# Patient Record
Sex: FEMALE
[De-identification: names, ages, dates, MRNs, and addresses within clinical notes are randomized per-mention and may not be internally consistent; named-entity substitution may affect disease eponyms.]

---

## 2018-07-10 ENCOUNTER — HOSPITAL ENCOUNTER (EMERGENCY)
Dept: HOSPITAL 97 - ER | Age: 23
LOS: 1 days | Discharge: HOME | End: 2018-07-11
Payer: COMMERCIAL

## 2018-07-10 DIAGNOSIS — Z3A.08: ICD-10-CM

## 2018-07-10 DIAGNOSIS — R10.30: ICD-10-CM

## 2018-07-10 DIAGNOSIS — O26.891: Primary | ICD-10-CM

## 2018-07-10 DIAGNOSIS — Z88.1: ICD-10-CM

## 2018-07-10 LAB
BUN BLD-MCNC: 10 MG/DL (ref 7–18)
GLUCOSE SERPLBLD-MCNC: 82 MG/DL (ref 74–106)
HCG SERPL-ACNC: (no result) MIU/ML (ref 1–3)
HCT VFR BLD CALC: 35.4 % (ref 36–45)
LYMPHOCYTES # SPEC AUTO: 1.6 K/UL (ref 0.7–4.9)
MCH RBC QN AUTO: 25.6 PG (ref 27–35)
MCV RBC: 75.7 FL (ref 80–100)
PMV BLD: 8.7 FL (ref 7.6–11.3)
POTASSIUM SERPL-SCNC: 3.3 MMOL/L (ref 3.5–5.1)
RBC # BLD: 4.68 M/UL (ref 3.86–4.86)

## 2018-07-10 PROCEDURE — 76817 TRANSVAGINAL US OBSTETRIC: CPT

## 2018-07-10 PROCEDURE — 81003 URINALYSIS AUTO W/O SCOPE: CPT

## 2018-07-10 PROCEDURE — 36415 COLL VENOUS BLD VENIPUNCTURE: CPT

## 2018-07-10 PROCEDURE — 96375 TX/PRO/DX INJ NEW DRUG ADDON: CPT

## 2018-07-10 PROCEDURE — 81025 URINE PREGNANCY TEST: CPT

## 2018-07-10 PROCEDURE — 96374 THER/PROPH/DIAG INJ IV PUSH: CPT

## 2018-07-10 PROCEDURE — 85025 COMPLETE CBC W/AUTO DIFF WBC: CPT

## 2018-07-10 PROCEDURE — 99284 EMERGENCY DEPT VISIT MOD MDM: CPT

## 2018-07-10 PROCEDURE — 84702 CHORIONIC GONADOTROPIN TEST: CPT

## 2018-07-10 PROCEDURE — 80048 BASIC METABOLIC PNL TOTAL CA: CPT

## 2018-07-10 NOTE — ER
Nurse's Notes                                                                                     

 [facility]                                                                

Name: Thomas Chung                                                                               

Age: 23 yrs                                                                                       

Sex: Female                                                                                       

: 1995                                                                                   

MRN: [id number]                                                                                   

Arrival Date: 07/10/2018                                                                          

Time: 21:38                                                                                       

Account#: [id number]                                                                            

Bed 6                                                                                             

Private MD:                                                                                       

Diagnosis: Lower abdominal pain, unspecified                                                      

                                                                                                  

Presentation:                                                                                     

07/10                                                                                             

21:57 Presenting complaint: Patient states: lower abd pain started today. pt sees Dr. Gold  ak1 

      for OB services. Transition of care: patient was not received from another setting of       

      care. Onset of symptoms was July 10, 2018. Risk Assessment: Do you want to hurt             

      yourself or someone else? Patient reports no desire to harm self or others. Initial         

      Sepsis Screen: Does the patient meet any 2 criteria? No. Patient's initial sepsis           

      screen is negative. Does the patient have a suspected source of infection? No.              

      Patient's initial sepsis screen is negative. Care prior to arrival: None.                   

21:57 Method Of Arrival: Ambulatory                                                           ak1 

21:57 Acuity: IVETTE 3                                                                           ak1 

                                                                                                  

Triage Assessment:                                                                                

22:01 General: Appears in no apparent distress. Behavior is calm, cooperative. Pain:          ak1 

      Complains of pain in suprapubic area, right lower quadrant and left lower quadrant.         

      EENT: No signs and/or symptoms were reported regarding the EENT system. Neuro: No           

      deficits noted. Cardiovascular: No deficits noted. Respiratory: No deficits noted. GI:      

      Reports lower abdominal pain. : Reports cramping, in lower abd Denies discharge,          

      vaginal bleeding. Derm: No signs and/or symptoms reported regarding the dermatologic        

      system. Musculoskeletal: No signs and/or symptoms reported regarding the                    

      musculoskeletal system.                                                                     

                                                                                                  

OB/GYN:                                                                                           

21:56 LMP 2018, Pregnancy Verified, EDC 2019, Gestational age from LMP: 8 weeks 4   ak1 

      days                                                                                        

23:27  2, Full Term 1, Premature 0,  0, Living 1                               [id number] 

                                                                                                  

Historical:                                                                                       

- Allergies:                                                                                      

22:01 Amoxicillin;                                                                            ak1 

- Home Meds:                                                                                      

22:01 None [Active];                                                                          ak1 

- PMHx:                                                                                           

22:01 scoliosis;                                                                              ak1 

- PSHx:                                                                                           

22:01 Appendectomy; ;                                                                ak1 

                                                                                                  

- Immunization history:: Adult Immunizations unknown.                                             

- Social history:: Smoking status: Patient/guardian denies using tobacco.                         

- Ebola Screening: : No symptoms or risks identified at this time.                                

                                                                                                  

                                                                                                  

Screenin:03 Abuse screen: Denies threats or abuse. Denies injuries from another. Nutritional        ak1 

      screening: No deficits noted. Tuberculosis screening: No symptoms or risk factors           

      identified. Fall Risk None identified.                                                      

                                                                                                  

Assessment:                                                                                       

22:03 Reassessment: Patient appears in no apparent distress at this time. No changes from     ak1 

      previously documented assessment. see triage assessment.                                    

22:19 General: Appears uncomfortable, Behavior is calm, cooperative, appropriate for age.     ea  

      Pain: Complains of pain in right lower quadrant and righ upper quadrant Pain currently      

      is 9 out of 10 on a pain scale. Quality of pain is described as aching, Is                  

      intermittent. Neuro: Level of Consciousness is awake, alert, obeys commands, Oriented       

      to person, place, time, situation. Cardiovascular: Patient's skin is warm and dry.          

      Respiratory: Airway is patent Respiratory effort is even, unlabored, Respiratory            

      pattern is regular, symmetrical, Breath sounds are clear bilaterally. GI: Abdomen is        

      non-distended, Bowel sounds present X 4 quads. GI: Reports lower abdominal pain, upper      

      abdominal pain, diarrhea, vomiting. : No signs and/or symptoms were reported              

      regarding the genitourinary system. Derm: Skin is pink, warm \T\ dry.                       

22:35 Reassessment: Pt taken to ultrasound.                                                   ea  

23:00 Reassessment: Patient and/or family updated on plan of care and expected duration. Pain ea  

      level reassessed. Patient is alert, oriented x 3, equal unlabored respirations, skin        

      warm/dry/pink.                                                                              

                                                                                             

00:05 Reassessment: Patient and/or family updated on plan of care and expected duration. Pain ea  

      level reassessed. Patient is alert, oriented x 3, equal unlabored respirations, skin        

      warm/dry/pink. Discharge instructions given to patient, verbalized the understanding of     

      instruction.                                                                                

                                                                                                  

Vital Signs:                                                                                      

07/10                                                                                             

21:56  / 71; Pulse 79; Resp 18; Temp 98.1; Pulse Ox 99% on R/A; Weight 53.98 kg (R);    ak1 

      Height 4 ft. 11 in. (149.86 cm) (R); Pain 7/10;                                             

22:21  / 72; Pulse 90; Resp 18; Pulse Ox 98% on R/A; Pain 7/10;                         ea  

23:45  / 76; Pulse 80; Resp 18; Temp 97.8; Pulse Ox 99% on R/A; Pain 0/10;              ea  

21:56 Body Mass Index 24.03 (53.98 kg, 149.86 cm)                                             ak1 

                                                                                                  

ED Course:                                                                                        

21:38 Patient arrived in ED.                                                                  al2 

21:45 Nevaeh Braun, RN is Primary Nurse.                                                    ea  

21:56 Arm band placed on Patient placed in an exam room, on a stretcher, Patient notified of  ak1 

      wait time.                                                                                  

22:00 Triage completed.                                                                       ak1 

22:03 Patient has correct armband on for positive identification. Bed in low position. Call   ak1 

      light in reach. Side rails up X 1. Adult w/ patient. Pulse ox on. NIBP on.                  

22:03 Urine collected: clean catch specimen.                                                  ak1 

22:09 Lincoln Hernandez PA is PHCP.                                                               [id number] 

22:09 Jac Morales MD is Attending Physician.                                             [id number] 

22:32 Inserted saline lock: 20 gauge in right antecubital area, using aseptic technique.      ea  

      Blood collected.                                                                            

22:54 US Transvaginal Ob In Process Unspecified.                                              EDMS

                                                                                             

00:06 No provider procedures requiring assistance completed. IV discontinued, intact,         ea  

      bleeding controlled, No redness/swelling at site. Pressure dressing applied.                

                                                                                                  

Administered Medications:                                                                         

07/10                                                                                             

23:26 Drug: Phenergan 12.5 mg Route: IVP; Site: right antecubital;                            ea  

23:48 Follow up: Response: Nausea unchanged                                                   lp1 

23:36 Drug: Zofran 4 mg Route: IVP; Site: right antecubital;                                  ea  

                                                                                             

00:05 Follow up: Response: No adverse reaction                                                ea  

07/10                                                                                             

23:48 Drug: Potassium Chloride 40 mEq Route: PO;                                              lp1 

                                                                                             

00:05 Follow up: Response: No adverse reaction                                                ea  

07/10                                                                                             

23:48 Drug: Pepcid 20 mg Route: IVP; Site: right antecubital;                                 ea  

                                                                                             

00:05 Follow up: Response: No adverse reaction                                                ea  

                                                                                                  

                                                                                                  

Outcome:                                                                                          

07/10                                                                                             

23:31 Discharge ordered by MD.                                                                [id number] 

                                                                                             

00:06 Discharged to home ambulatory, with family.                                             ea  

      Condition: improved                                                                         

      Discharge instructions given to patient, Instructed on discharge instructions, follow       

      up and referral plans. Demonstrated understanding of instructions, follow-up care.          

00:09 Patient left the ED.                                                                    ea  

                                                                                                  

Signatures:                                                                                       

Dispatcher [employer]                           EDMS                                                 

Jane Decker RN                         RN   lp1                                                  

Lincoln Hernandez PA PA   jr8                                                  

Sanjana Mustafa RN                       RN   ak1                                                  

Nevaeh Braun RN                      RN   Rashmi Chan2                                                  

                                                                                                  

Corrections: (The following items were deleted from the chart)                                    

00:08 00:06 Discharge instructions given to patient, Instructed on discharge instructions,    ea  

      follow up and referral plans. medication usage, Demonstrated understanding of               

      instructions, follow-up care, medications, ea                                               

00:08 00:06 Prescriptions given X 2, ea                                                       ea  

                                                                                                  

**************************************************************************************************

## 2018-07-10 NOTE — RAD REPORT
EXAM DESCRIPTION:  US - Transvaginal OB - 7/10/2018 10:54 pm

 

CLINICAL HISTORY:  ABD PAIN

 

COMPARISON:  No comparisons

 

FINDINGS:  A single normal size and shaped gestational sac is seen within the uterus. Within the sac 
is a single fetal pole with crown-rump length measuring 11 mm corresponding to 7 weeks 2 days gestati
onal age. JESSIKA 02/24/2019.

 

Cardiac activity is normal measuring 153 BPM.

 

No unexpected findings.

 

Maternal adnexa showed no worrisome finding.

 

IMPRESSION:  Single live early intrauterine gestation as detailed above.

## 2018-07-10 NOTE — EDPHYS
Physician Documentation                                                                           

 [facility]                                                                

Name: Thomas Chung                                                                               

Age: 23 yrs                                                                                       

Sex: Female                                                                                       

: 1995                                                                                   

MRN: [id number]                                                                                   

Arrival Date: 07/10/2018                                                                          

Time: 21:38                                                                                       

Account#: [id number]                                                                            

Bed 6                                                                                             

Private MD:                                                                                       

ED Physician Jac Morales                                                                      

HPI:                                                                                              

07/10                                                                                             

23:27 This 23 yrs old  Female presents to ER via Ambulatory with complaints of 8      jr8 

      WEEKS PREG, LOWER AB PAIN.                                                                  

23:27 The patient presents to the emergency department with abdominal pain, of the right      jr8 

      lower quadrant and left lower quadrant, that started today, described as crampy, sharp.     

      The estimated gestational age is 8 weeks. Pregnancy course: Prenatal care: private OB       

      physician. Associated signs and symptoms: The patient has no apparent associated signs      

      or symptoms. The patient has not experienced similar symptoms in the past. The patient      

      has not recently seen a physician.                                                          

                                                                                                  

OB/GYN:                                                                                           

21:56 LMP 2018, Pregnancy Verified, EDC 2019, Gestational age from LMP: 8 weeks 4   ak1 

      days                                                                                        

23:27  2, Full Term 1, Premature 0,  0, Living 1                               jr8 

                                                                                                  

Historical:                                                                                       

- Allergies:                                                                                      

22:01 Amoxicillin;                                                                            ak1 

- Home Meds:                                                                                      

22:01 None [Active];                                                                          ak1 

- PMHx:                                                                                           

22:01 scoliosis;                                                                              ak1 

- PSHx:                                                                                           

22:01 Appendectomy; ;                                                                ak1 

                                                                                                  

- Immunization history:: Adult Immunizations unknown.                                             

- Social history:: Smoking status: Patient/guardian denies using tobacco.                         

- Ebola Screening: : No symptoms or risks identified at this time.                                

                                                                                                  

                                                                                                  

ROS:                                                                                              

23:27 Eyes: Negative for injury, pain, redness, and discharge, ENT: Negative for injury,      jr8 

      pain, and discharge, Neck: Negative for injury, pain, and swelling, Cardiovascular:         

      Negative for chest pain, palpitations, and edema, Respiratory: Negative for shortness       

      of breath, cough, wheezing, and pleuritic chest pain, Back: Negative for injury and         

      pain, MS/Extremity: Negative for injury and deformity, Skin: Negative for injury, rash,     

      and discoloration, Neuro: Negative for headache, weakness, numbness, tingling, and          

      seizure.                                                                                    

23:27 Abdomen/GI: Positive for abdominal pain, Negative for nausea, vomiting, and diarrhea,       

      abdominal distension, anorexia, dysphagia, hematemesis, black/tarry stool, rectal pain,     

      rectal bleeding, bowel incontinence, flatulence.                                            

                                                                                                  

Exam:                                                                                             

23:27 Cardiovascular:  Regular rate and rhythm with a normal S1 and S2.  No gallops, murmurs, jr8 

      or rubs.  Normal PMI, no JVD.  No pulse deficits. Respiratory:  Lungs have equal breath     

      sounds bilaterally, clear to auscultation and percussion.  No rales, rhonchi or wheezes     

      noted.  No increased work of breathing, no retractions or nasal flaring. Back:  No          

      spinal tenderness.  No costovertebral tenderness.  Full range of motion. Skin:  Warm,       

      dry with normal turgor.  Normal color with no rashes, no lesions, and no evidence of        

      cellulitis. MS/ Extremity:  Pulses equal, no cyanosis.  Neurovascular intact.  Full,        

      normal range of motion. Neuro:  Awake and alert, GCS 15, oriented to person, place,         

      time, and situation.  Cranial nerves II-XII grossly intact.  Motor strength 5/5 in all      

      extremities.  Sensory grossly intact.  Cerebellar exam normal.  Normal gait.                

23:27 Abdomen/GI: Inspection: abdomen appears normal, Bowel sounds: active, all quadrants,        

      Palpation: soft, in all quadrants, mild abdominal tenderness, in the suprapubic area        

      and left lower quadrant, mass, is not appreciated, rebound tenderness, is not               

      appreciated, voluntary guarding, is not appreciated, involuntary guarding, is not           

      appreciated, no appreciated organomegaly, Indicators: McBurney's point is not tender,       

      Ascencio's sign is negative, Rovsing's sign is negative, Liver: no appreciated palpable       

      abnormalities, tenderness, is not appreciated.                                              

                                                                                                  

Vital Signs:                                                                                      

21:56  / 71; Pulse 79; Resp 18; Temp 98.1; Pulse Ox 99% on R/A; Weight 53.98 kg (R);    ak1 

      Height 4 ft. 11 in. (149.86 cm) (R); Pain 7/10;                                             

22:21  / 72; Pulse 90; Resp 18; Pulse Ox 98% on R/A; Pain 7/10;                         ea  

23:45  / 76; Pulse 80; Resp 18; Temp 97.8; Pulse Ox 99% on R/A; Pain 0/10;              ea  

21:56 Body Mass Index 24.03 (53.98 kg, 149.86 cm)                                             ak1 

                                                                                                  

MDM:                                                                                              

22:09 Patient medically screened.                                                             jr8 

23:30 Data reviewed: vital signs, nurses notes, lab test result(s), radiologic studies,       jr8 

      ultrasound, and as a result, I will discharge patient. Data interpreted: Pulse              

      oximetry: on room air is 98 %. Interpretation: normal. Counseling: I had a detailed         

      discussion with the patient and/or guardian regarding: the historical points, exam          

      findings, and any diagnostic results supporting the discharge/admit diagnosis, lab          

      results, radiology results, the need for outpatient follow up, an OB/Gyne specialist,       

      to return to the emergency department if symptoms worsen or persist or if there are any     

      questions or concerns that arise at home.                                                   

23:31 ED course: Patient without elevation in WBC count. Fetus normal appearing on US.        jr8 

      Discussed with patient more then likely pain from broad ligament or from           

      scar. To f/u with ob/gyn .                                                                  

                                                                                                  

07/10                                                                                             

22:15 Order name: Quantitative Hcg; Complete Time: 23:29                                      jr8 

07/10                                                                                             

22:15 Order name: Basic Metabolic Panel; Complete Time: 23:29                                  

07/10                                                                                             

22:15 Order name: CBC with Diff; Complete Time: 23:21                                         jr8 

07/10                                                                                             

23:04 Order name: Urine Dipstick--Ancillary (enter results); Complete Time: 02:25             ms  

07/10                                                                                             

23:04 Order name: Urine Pregnancy--Ancillary (enter results); Complete Time: 02:25            ms  

07/10                                                                                             

22:15 Order name: Urine Pregnancy Test (obtain specimen); Complete Time: 22:18                jr8 

07/10                                                                                             

22:15 Order name: IV Saline Lock; Complete Time: 22:33                                        jr8 

07/10                                                                                             

22:15 Order name: Labs collected and sent; Complete Time: 22:33                               jr8 

07/10                                                                                             

22:30 Order name: US Transvaginal Ob; Complete Time: 23:21                                    jr8 

07/10                                                                                             

22:15 Order name: NPO; Complete Time: 22:18                                                   jr8 

07/10                                                                                             

22:15 Order name: Urine Dipstick-Ancillary (obtain specimen); Complete Time: 22:18             

                                                                                                  

Administered Medications:                                                                         

23:26 Drug: Phenergan 12.5 mg Route: IVP; Site: right antecubital;                            ea  

23:48 Follow up: Response: Nausea unchanged                                                   lp1 

23:36 Drug: Zofran 4 mg Route: IVP; Site: right antecubital;                                  ea  

                                                                                             

00:05 Follow up: Response: No adverse reaction                                                ea  

07/10                                                                                             

23:48 Drug: Potassium Chloride 40 mEq Route: PO;                                              lp1 

                                                                                             

00:05 Follow up: Response: No adverse reaction                                                ea  

07/10                                                                                             

23:48 Drug: Pepcid 20 mg Route: IVP; Site: right antecubital;                                 ea  

                                                                                             

00:05 Follow up: Response: No adverse reaction                                                ea  

                                                                                                  

                                                                                                  

Disposition:                                                                                      

:18 Co-signature as Attending Physician, Jac Morales MD Available for consultation at    ps1 

      all times.                                                                                  

                                                                                                  

Disposition:                                                                                      

07/10/18 23:31 Discharged to Home. Impression: Lower abdominal pain, unspecified.                 

- Condition is Stable.                                                                            

- Discharge Instructions: Abdominal Pain, Adult, Abdominal Pain During Pregnancy.                 

                                                                                                  

- Medication Reconciliation Form, Thank You Letter, Antibiotic Education, Prescription            

  Opioid Use form.                                                                                

- Follow up: Private Physician; When: 2 - 3 days; Reason: Recheck today's complaints,             

  Continuance of care, Re-evaluation by your physician.                                           

- Problem is new.                                                                                 

- Symptoms have improved.                                                                         

                                                                                                  

                                                                                                  

                                                                                                  

Signatures:                                                                                       

Dispatcher [employer]                           EDMS                                                 

Jane Decker, RN                         RN   lp1                                                  

Lincoln Hernandez PA                        PA   jr8                                                  

Sanjana Mustafa RN                       RN   ak1                                                  

Nevaeh Braun RN                      RN   Jac Mixon MD MD   ps1                                                  

                                                                                                  

Corrections: (The following items were deleted from the chart)                                    

00:09 07/10 23:31 07/10/2018 23:31 Discharged to Home. Impression: Lower abdominal pain,      ea  

      unspecified. Condition is Stable. Forms are Medication Reconciliation Form, Thank You       

      Letter, Antibiotic Education, Prescription Opioid Use. Follow up: Private Physician;        

      When: 2 - 3 days; Reason: Recheck today's complaints, Continuance of care,                  

      Re-evaluation by your physician. Problem is new. Symptoms have improved. jr8                

                                                                                                  

**************************************************************************************************

## 2018-07-11 VITALS — OXYGEN SATURATION: 99 % | TEMPERATURE: 97.8 F | SYSTOLIC BLOOD PRESSURE: 120 MMHG | DIASTOLIC BLOOD PRESSURE: 76 MMHG

## 2020-04-08 ENCOUNTER — HOSPITAL ENCOUNTER (EMERGENCY)
Dept: HOSPITAL 97 - ER | Age: 25
LOS: 1 days | Discharge: HOME | End: 2020-04-09
Payer: COMMERCIAL

## 2020-04-08 DIAGNOSIS — R55: Primary | ICD-10-CM

## 2020-04-08 DIAGNOSIS — Z88.1: ICD-10-CM

## 2020-04-08 LAB
BUN BLD-MCNC: 14 MG/DL (ref 7–18)
GLUCOSE SERPLBLD-MCNC: 86 MG/DL (ref 74–106)
HCT VFR BLD CALC: 40.4 % (ref 36–45)
LYMPHOCYTES # SPEC AUTO: 1.2 K/UL (ref 0.7–4.9)
PMV BLD: 8.5 FL (ref 7.6–11.3)
POTASSIUM SERPL-SCNC: 3.7 MMOL/L (ref 3.5–5.1)
RBC # BLD: 4.96 M/UL (ref 3.86–4.86)
UA COMPLETE W REFLEX CULTURE PNL UR: (no result)

## 2020-04-08 PROCEDURE — 81025 URINE PREGNANCY TEST: CPT

## 2020-04-08 PROCEDURE — 96360 HYDRATION IV INFUSION INIT: CPT

## 2020-04-08 PROCEDURE — 93005 ELECTROCARDIOGRAM TRACING: CPT

## 2020-04-08 PROCEDURE — 71275 CT ANGIOGRAPHY CHEST: CPT

## 2020-04-08 PROCEDURE — 36415 COLL VENOUS BLD VENIPUNCTURE: CPT

## 2020-04-08 PROCEDURE — 85379 FIBRIN DEGRADATION QUANT: CPT

## 2020-04-08 PROCEDURE — 80048 BASIC METABOLIC PNL TOTAL CA: CPT

## 2020-04-08 PROCEDURE — 81003 URINALYSIS AUTO W/O SCOPE: CPT

## 2020-04-08 PROCEDURE — 85025 COMPLETE CBC W/AUTO DIFF WBC: CPT

## 2020-04-08 PROCEDURE — 81015 MICROSCOPIC EXAM OF URINE: CPT

## 2020-04-08 PROCEDURE — 99284 EMERGENCY DEPT VISIT MOD MDM: CPT

## 2020-04-08 PROCEDURE — 70450 CT HEAD/BRAIN W/O DYE: CPT

## 2020-04-08 NOTE — XMS REPORT
Summary of Care

                           Created on:August 15, 2019



Patient:Thomas Chung

Sex:Female

:1995

External Reference #:[id number]





Demographics







                          Address                   [address]



                                                    [place] [address]

 

                          Mobile Phone              [phone]

 

                          Phone                     [phone]

 

                          Home Phone                [phone]

 

                          Email Address             [email or web address]

 

                          Email Address             [email or web address]

 

                          Preferred Language        English

 

                          Marital Status            Single

 

                          Church Affiliation     Unknown

 

                          Race                      White

 

                          Ethnic Group               or 









Author







                          Organization              [facility]

 

                          Address                   [address] [place] [address]









Support







                Name            Relationship    Address         Phone

 

                Curt Leyvae    Unavailable     [address] [place] [address] [phone]



                                                [place] [address] 

 

                Enriqueta Hernández   Unavailable     Unavailable     [phone]









Care Team Providers







                    Name                Role                Phone

 

                    LEAH Caraballo FNVIDHYA      Primary Care Provider [phone]









Reason for Visit







                          Reason                    Comments

 

                          NEXPLANON                 check







Encounter Details







             Date         Type         Department   Care Team    Description

 

             2019   [facility]

xplanon in 

place



                                                            [facility]



                                        (Primary Dx)



                                                            [address] [facility]



                          [address] [place], [place]    [place]



                                        



                                                            [address]



                                        [place] [address]



                                        



                                                [phone] (Fax) 







Allergies







             Active Allergy Reactions    Severity     Noted Date   Comments

 

             Amoxicillin  Unknown - See comments              2015   



documented as of this encounter (statuses as of 08/15/2019)



Medications







          Medication Sig       Dispensed Refills   Start Date End Date  Status

 

          prenatal vitamin Take 1 tablet 100 tablet 3         2017

019 Discontinued



          w/FA tablet by mouth                                          



                    daily.                                            

 

          docusate calcium Take 1 capsule 60 capsule 1         2017 Discontinued



          240 mg capsule by mouth once                                         



                    daily as                                          



                    needed for                                         



                    Constipation.                                         

 

          ibuprofen 600 mg Take 1 tablet 30 tablet 1         2017

19 Discontinued



          tablet    by mouth every                                         



                    6 (six) hours                                         



                    as needed for                                         



                    Pain (scale                                         



                    1-3) or Pain                                         



                    (scale 4-6)                                         



                    (Pain). Take                                         



                    with food or                                         



                    milk.                                             

 

          HYDROcodone-aceta Take 1 tablet 20 tablet 0         2017

019 Discontinued



          minophen 5-325 mg by mouth every                                      

   



          tablet    6 (six) hours                                         



                    as needed for                                         



                    Pain (scale                                         



                    4-6) or Pain                                         



                    (scale 7-10).                                         

 

          ferrous sulfate Take 1 tablet 90 tablet 2         2017

9 Discontinued



          325 mg (65 mg by mouth 3                                         



          iron) tablet (three) times                                         



                    daily with                                         



                    meals.                                            

 

          foLIC acid 1 mg Take 1 tablet 30 tablet 3         2017

9 Discontinued



          tablet    by mouth                                          



                    daily.                                            









          Hospital, Clinic, or Other Ordered Dose Route     Frequency Start Date

 End Date  

Status



          Facility Administered                                                 

  



          Medication                                                   

 

          medroxyPROGESTERone 150 mg    IM        [id number]  2019          

 Active



          (DEPO-PROVERA) injection 150                                          

         



          mgIndications: Encounter for                                          

         



          management and injection of                                           

        



          depo-Provera                                                   



documented as of this encounter (statuses as of 08/15/2019)



Active Problems

No known active problemsdocumented as of this encounter (statuses as of 
08/15/2019)



Resolved Problems







                    Problem             Noted Date          Resolved Date

 

                    Anemia due to blood loss, acute 2017

18

 

                    Pregnancy with 37 weeks completed gestation 2017

 

                    Premature labor     2017

 

                    Meconium in amniotic fluid 2017

 

                    37 weeks gestation of pregnancy 2017          04/15/20

19

 

                    Liveborn by  2017

 

                    Fetus abdominal wall defect, fetus 1 2017

 

                    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



documented as of this encounter (statuses as of 08/15/2019)



Immunizations







                    Name                Administration Dates Next Due

 

                    Influenza Virus Vaccine Quad IM 3+ YRS 2017          



documented as of this encounter



Social History







             Tobacco Use  Types        Packs/Day    Years Used   Date

 

             Former Smoker                                        Quit: 04/15/20

17









                Smokeless Tobacco: Never Used                                 









                Alcohol Use     Drinks/Week     oz/Week         Comments

 

                Yes                                             socially









                          Sex Assigned at Birth     Date Recorded

 

                          Not on file               









                    Job Start Date      Occupation          Industry

 

                    Not on file         Not on file         Not on file









                    Travel History      Travel Start        Travel End









                                        No recent travel history available.



documented as of this encounter



Last Filed Vital Signs







                Vital Sign      Reading         Time Taken      Comments

 

                Blood Pressure  115/73          2019  2:37 PM CDT 

 

                Pulse           94              2019  2:37 PM CDT 

 

                Temperature     37.2 C (98.9 F) 2019  2:37 PM CDT 

 

                Respiratory Rate 16              2019  2:37 PM CDT 

 

                Oxygen Saturation -               -               

 

                Inhaled Oxygen Concentration -               -               

 

                Weight          54.1 kg (119 lb 4 oz) 2019  2:37 PM CDT 

 

                Height          149.9 cm (4' 11") 2019  2:37 PM CDT 

 

                Body Mass Index 24.09           2019  2:37 PM CDT 



documented in this encounter



Progress Notes

Albert Caraballo, BHAVANIP - 2019  2:45 PM CDTFormatting of this note might 
be different from the original.

Chief complaint:

Chief Complaint

Patient presents with

 NEXPLANON

  check



HPI Patient is a  here for Nexplanon check. Patient denies any complaints. 
Pating desires to continue with Nexplanon as BCM.



Histories

OB History

 Para Term  AB Living

2 2 2 0 0 2

SAB TAB Ectopic Multiple Live Births

        2



# Outcome Date GA Lbr [place]/2nd Weight Sex Delivery Anes PTL Lv

2 Term 19 37w0d   F  SEC   ANGELICA

1 Term 17 37w2d   M  SEC   ANGELICA



Obstetric Comments

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



Past Medical History:

Diagnosis Date

 Menstrual disorder 

 Ovarian cyst

 Scoliosis

 Syphilis

 treated



Family History

Problem Relation Age of Onset

 No Significant Medical Problems Mother

 Hypertension Father

 No Significant Medical Problems Sister

 Asthma Brother

 Diabetes Maternal Aunt

 Diabetes Maternal Uncle

 No Significant Medical Problems Paternal Aunt

 No Significant Medical Problems Paternal Uncle

 Breast Cancer Maternal Grandmother

 Diabetes Maternal Grandmother

 Diabetes Maternal Grandfather

 Diabetes Paternal Grandmother

 Hypertension Paternal Grandmother

 High cholesterol Paternal Grandmother



Family Status

Relation Name Status

 Mo  Alive

 Fa  Alive

 Sis  Alive

 Bro  Alive

 MAunt  Alive

 MUnc  Alive

 PAunt  Alive

 PUnc  Alive

 MGMo  

 MGFa  Alive

 PGMo  Alive

 PGFa  Alive



Past Surgical History:

Procedure Laterality Date

 APPENDECTOMY  

 with subsequent rupture

  SECTION N/A 2017

 Surgeon: Amilcar Gold MD;  Location: [facility] Labor and 
Delivery OR Location



Social History



Socioeconomic History

 Marital status: Single

  Spouse name: Not on file

 Number of children: Not on file

 Years of education: Not on file

 Highest education level: Not on file

Occupational History

 Not on file

Social Needs

 Financial resource strain: Not on file

 Food insecurity:

  Worry: Not on file

  Inability: Not on file

 Transportation needs:

  Medical: Not on file

  Non-medical: Not on file

Tobacco Use

 Smoking status: Former Smoker

  Last attempt to quit: 4/15/2017

  Years since quittin.3

 Smokeless tobacco: Never Used

Substance and Sexual Activity

 Alcohol use: Yes

  Comment: socially

 Drug use: No

  Types: Marijuana

  Comment: quit 5 yrs ago

 Sexual activity: Yes

  Partners: Male

  Birth control/protection: Coitus interruptus

  Comment: last sexual intercourse 2019

Lifestyle

 Physical activity:

  Days per week: Not on file

  Minutes per session: Not on file

 Stress: Not on file

Relationships

 Social connections:

  Talks on phone: Not on file

  Gets together: Not on file

  Attends Religion service: Not on file

  Active member of club or organization: Not on file

  Attends meetings of clubs or organizations: Not on file

  Relationship status: Not on file

 Intimate partner violence:

  Fear of current or ex partner: Not on file

  Emotionally abused: Not on file

  Physically abused: Not on file

  Forced sexual activity: Not on file

Other Topics Concern

 Not on file

Social History Narrative

 Patient lives with boyfriend and children.



Social History



Substance and Sexual Activity

Sexual Activity Yes

 Partners: Male

 Birth control/protection: Coitus interruptus

 Comment: last sexual intercourse 2019







Labs

No new labs



Radiology

No new radiology.



Allergies

Thomas is allergic to amoxicillin.



Medications

Thomas has a current medication list which includes the following Facility-
Administered Medications:medroxyprogesterone.



Review of Systems

Constitutional: Negative for activity change, appetite change, fatigue, 
unexpected weight change, weight gain and weight loss.

HENT: Negative for sore throat.

Eyes: Negative for visual disturbance.

Respiratory: Negative for cough and shortness of breath.

Breasts: Negative for discharge, mass, pain and unequal size.

Cardiovascular: Negative for chest pain, palpitations and leg swelling.

Gastrointestinal: Negative.  Negative for abdominal pain, anal bleeding, blood 
in stool, constipation, diarrhea, nausea, rectal pain and vomiting.

Genitourinary: Negative for bladder incontinence, dysuria, urgency, flank pain, 
vaginal bleeding, vaginal discharge, genital sores, vaginal pain and pelvic 
pain.

Skin: Negative for color change and rash.

Neurological: Negative.  Negative for dizziness, syncope and headaches.

Psychiatric/Behavioral: Negative for confusion, self-injury and sleep 
disturbance. The patient is not nervous/anxious.

Hematological: Negative for cold intolerance and heat intolerance.

Endocrine: Negative for hair loss, cold intolerance, heat intolerance, weight 
gain and weight loss.



/73 (BP Location: Right arm, Patient Position: Sitting, BP CUFF SIZE: 
Adult Small)  | Pulse 94 | Temp 37.2 C (98.9 F) (Oral)  | Resp 16  | Ht 4' 
11" (1.499 m)  | Wt 119 lb 4 oz (54.1 kg)  | LMP 2019 (Approximate)  | BMI
24.09 kg/m

Pregravid BMI: Could not be calculated



Physical Exam

Vitals reviewed.

Constitutional: She is oriented to person, place, and time. She appears well-
developed and well-nourished. Her body habitus is normal.

Cardiovascular: Regular rate and rhythm. No peripheral edema present.

Pulmonary/Chest: Normal inspiratory effort.

Neuro/Psychiatric: Inappropriate mood and affect. She is oriented to person, 
place, and time.

Skin: Skin normal. No lesion, no rash and no ulceration present.





Nexplanon Palpated in right hand.







Assessment/Plan

Nexplanon in place  (primary encounter diagnosis)

Comment: Nexplanon palpated in right arm.

Plan: Patient desires to continue with Nexplanon contraception. Provider has 
reviewed risks, benefits and alternatives contraception methods. Provider has 
also reviewed use, side effects and effectiveness of desires BCM vs other BCM.  
Encouraged abstinence until menses.  Encouraged use of condoms as back up x 1 
month and for safer sex.



Return to clinic for WWE.

Discussed treatment options.

Medications as ordered.

Reviewed patient instructions and provided printed copy.



This visit did not involve counseling and coordination that comprised more than 
50% of the visit time.

ANDREA Sanchez  8/15/2019  8:59 AM

Electronically signed by Albert Caraballo FNP at 08/15/2019  8:59 AM CDT
documented in this encounter



Plan of Treatment







             Date         Type         Specialty    Care Team    Description

 

                2019      Office Visit    [facility]   [facility]



                                        



                                                                [address], [place] [address]

[address]



                                        



                                                                [phone] (Fax) 









                Health Maintenance Due Date        Last Done       Comments

 

                MENINGOCOCCAL B VACCINES ( of 2006                      



                2 - Risk Bexsero 2-dose                                 



                series)                                         

 

                VARICELLA VACCINES (1 of 2 - 2009                      



                13+ 2-dose series)                                 

 

                HPV VACCINES (1 - Female 2011                      



                3-dose series)                                  

 

                DTaP,Tdap,and Td Vaccines (1 - 2015                      



                Tdap)                                           

 

                INFLUENZA VACCINE (#1) 2019      

 

                CHLAMYDIA SCREENING 04/15/2020      04/15/2019,     



                                                2019,     



                                                2016      

 

                PAP SMEAR       04/15/2022      04/15/2019      

 

                PNEUMOCOCCAL 0-64 YEARS Aged Out                        No longe

r eligible based



                COMBINED SERIES                                 on patient's age

 to



                                                                complete this to

pic



documented as of this encounter



Results

Not on filedocumented in this encounter



Visit Diagnoses







                                        Diagnosis

 

                                        Nexplanon in place - Primary







                                        Presence of subdermal contraceptive herny

ce



documented in this encounter



Insurance







          Payer     Benefit Plan Subscriber ID Effective Phone     Address   Typ

e



                    / Group             Dates                         

 

          [facility] xxxxxxxxx 2019-Cindy [phone]   

Medicaid



           WOMEN                            [address]



                                        



                                                            [place] 



                                                            [address] 









           Guarantor Name Account Type Relation to Date of Birth Phone      Bill

ing



                                 Patient                          Address

 

           Thomas Chung [facility]      Self       1995 [phone] [address]                                       (Home)     [place], [place]



                                                                  [id number]



documented as of this encounter



Advance Directives







                Name            Relationship    Healthcare Agent Communication



                                                Relationship    

 

                Curt Randhawa    Significant Other Primary healthcare agent [phone] 



                                                                (Mobile)[phone] (Home)

## 2020-04-08 NOTE — XMS REPORT
Summary of Care

                           Created on:2019



Patient:Thomas Chung

Sex:Female

:1995

External Reference #:[id number]





Demographics







                          Address                   [address]



                                                    [place] [address]

 

                          Mobile Phone              [phone]

 

                          Phone                     [phone]

 

                          Home Phone                [phone]

 

                          Email Address             [email or web address]

 

                          Email Address             [email or web address]

 

                          Preferred Language        English

 

                          Marital Status            Single

 

                          Mu-ism Affiliation     Unknown

 

                          Race                      White

 

                          Ethnic Group               or 









Author







                          Organization              [facility]

 

                          Address                   [address] [place] [address]









Support







                Name            Relationship    Address         Phone

 

                Curt Leyvae    Unavailable     [address] [place] [address] [phone]



                                                [place] [address] 

 

                Enriqueta Hernández   Unavailable     Unavailable     [phone]









Care Team Providers







                    Name                Role                Phone

 

                    LEHA Caraballo ANDREA      Primary Care Provider [phone]









Encounter Details







             Date         Type         Department   Care Team    Description

 

                    2019          Orders Only         [facility]



                          Doctor Unassigned, No     



                                                            [address] [facility]



                                        Name



                                        



                                                [place] [address] [address] [place] [address] 







Allergies







             Active Allergy Reactions    Severity     Noted Date   Comments

 

             Amoxicillin  Unknown - See comments              2015   



documented as of this encounter (statuses as of 2019)



Medications







          Medication Sig       Dispensed Refills   Start Date End Date  Status

 

          prenatal vitamin w/FA Take 1 tablet by 100 tablet 3         2017

           Active



          tablet    mouth daily.                                         

 

          docusate calcium 240 Take 1 capsule by 60 capsule 1         2017

           Active



          mg capsule mouth once daily                                         



                    as needed for                                         



                    Constipation.                                         

 

          ibuprofen 600 mg Take 1 tablet by 30 tablet 1         2017      

     Active



          tablet    mouth every 6                                         



                    (six) hours as                                         



                    needed for Pain                                         



                    (scale 1-3) or                                         



                    Pain (scale 4-6)                                         



                    (Pain). Take with                                         



                    food or milk.                                         

 

          HYDROcodone-acetamino Take 1 tablet by 20 tablet 0         2017 

          Active



          phen 5-325 mg tablet mouth every 6                                    

     



                    (six) hours as                                         



                    needed for Pain                                         



                    (scale 4-6) or                                         



                    Pain (scale 7-10).                                         

 

          ferrous sulfate 325 Take 1 tablet by 90 tablet 2         2017   

        Active



          mg (65 mg iron) mouth 3 (three)                                       

  



          tablet    times daily with                                         



                    meals.                                            

 

          foLIC acid 1 mg Take 1 tablet by 30 tablet 3         2017       

    Active



          tablet    mouth daily.                                         









          Hospital, Clinic, or Other Ordered Dose Route     Frequency Start Date

 End Date  

Status



          Facility Administered                                                 

  



          Medication                                                   

 

          medroxyPROGESTERone 150 mg    IM        [id number]  2019          

 Active



          (DEPO-PROVERA) injection 150                                          

         



          mgIndications: Encounter for                                          

         



          management and injection of                                           

        



          depo-Provera                                                   



documented as of this encounter (statuses as of 2019)



Active Problems

No known active problemsdocumented as of this encounter (statuses as of 
2019)



Resolved Problems







                    Problem             Noted Date          Resolved Date

 

                    Anemia due to blood loss, acute 2017

18

 

                    Pregnancy with 37 weeks completed gestation 2017

 

                    Premature labor     2017

 

                    Meconium in amniotic fluid 2017

 

                    37 weeks gestation of pregnancy 2017          04/15/20

19

 

                    Liveborn by  2017

 

                    Fetus abdominal wall defect, fetus 1 2017

 

                    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



documented as of this encounter (statuses as of 2019)



Immunizations







                    Name                Administration Dates Next Due

 

                    Influenza Virus Vaccine Quad IM 3+ YRS 2017          



documented as of this encounter



Social History







             Tobacco Use  Types        Packs/Day    Years Used   Date

 

             Former Smoker                                        Quit: 04/15/20

17









                Smokeless Tobacco: Never Used                                 









                Alcohol Use     Drinks/Week     oz/Week         Comments

 

                Yes                                             socially









                          Sex Assigned at Birth     Date Recorded

 

                          Not on file               









                    Job Start Date      Occupation          Industry

 

                    Not on file         Not on file         Not on file









                    Travel History      Travel Start        Travel End









                                        No recent travel history available.



documented as of this encounter



Last Filed Vital Signs

Not on filedocumented in this encounter



Plan of Treatment







             Date         Type         Specialty    Care Team    Description

 

                2019      Office Visit    [facility]   Albert Caraballo, FNP



                                        



                                                                [address], 

[address]



                                        



                                                                [phone] (Fax) 

 

                2019      Office Visit    [facility]   Ayla Bowers, WHCNP



                                        



                                                                [address], 

[address]



                                        



                                                                [phone] (Fax) 









                Health Maintenance Due Date        Last Done       Comments

 

                MENINGOCOCCAL B VACCINES (1 of 2006                      



                2 - Risk Bexsero 2-dose                                 



                series)                                         

 

                VARICELLA VACCINES (1 of 2 - 2009                      



                13+ 2-dose series)                                 

 

                HPV VACCINES (1 - Female 2011                      



                3-dose series)                                  

 

                DTaP,Tdap,and Td Vaccines (1 - 2015                      



                Tdap)                                           

 

                INFLUENZA VACCINE 2019      

 

                CHLAMYDIA SCREENING 04/15/2020      04/15/2019,     



                                                2019,     



                                                2016      

 

                PAP SMEAR       04/15/2022      04/15/2019      

 

                PNEUMOCOCCAL 0-64 YEARS Aged Out                        No longe

r eligible based



                COMBINED SERIES                                 on patient's age

 to



                                                                complete this to

pic



documented as of this encounter



Procedures







             Procedure Name Priority     Date/Time    Associated Diagnosis Comme

nts

 

             NO SHOW OR MISSED Routine      2019  2:29 PM              



             APPOINTMENT POLICY              CDT                       



             ACKNOWLEDGEMENT                                        



documented in this encounter



Results

Not on filedocumented in this encounter



Insurance







          Payer     Benefit Plan Subscriber ID Effective Phone     Address   Typ

e



                    / Group             Dates                         

 

          [facility] xxxxxxxxx 2019-Prese [phone]   

Medicaid



           WOMEN                            [id number]



                                        



                                                            [place] 



                                                            [address] 



documented as of this encounter



Advance Directives







                Name            Relationship    Healthcare Agent Communication



                                                Relationship    

 

                Curt Randhawa    Significant Other Primary healthcare agent [id number] 



                                                                (Mobile)[phone] (Home)

## 2020-04-08 NOTE — XMS REPORT
Summary of Care

                           Created on:August 15, 2019



Patient:Thomas Chung

Sex:Female

:1995

External Reference #:[id number]





Demographics







                          Address                   [address]



                                                    [place] [address]

 

                          Mobile Phone              [phone]

 

                          Phone                     [phone]

 

                          Home Phone                [phone]

 

                          Email Address             [email or web address]

 

                          Email Address             [email or web address]

 

                          Preferred Language        English

 

                          Marital Status            Single

 

                          Mu-ism Affiliation     Unknown

 

                          Race                      White

 

                          Ethnic Group               or 









Author







                          Organization              [facility]

 

                          Address                   [address] [place] [id number]









Support







                Name            Relationship    Address         Phone

 

                Curt Leyvae    Unavailable     [address] [place] [address] [phone]



                                                [place] [address] 

 

                Enriqueta Hernández   Unavailable     Unavailable     [phone]









Care Team Providers







                    Name                Role                Phone

 

                    LEAH Caraballo FNVIDHYA      Primary Care Provider [phone]









Reason for Visit







                          Reason                    Comments

 

                          NEXPLANON                 check







Encounter Details







             Date         Type         Department   Care Team    Description

 

             2019   [facility]

xplanon in 

place



                                                            [facility]



                                        (Primary Dx)



                                                            [address] [facility]



                          [address] [place], [place]    [place]



                                        



                                                            [address]



                                        [place] [address]



                                        



                                                [phone] (Fax) 







Allergies







             Active Allergy Reactions    Severity     Noted Date   Comments

 

             Amoxicillin  Unknown - See comments              2015   



documented as of this encounter (statuses as of 08/15/2019)



Medications







          Medication Sig       Dispensed Refills   Start Date End Date  Status

 

          prenatal vitamin Take 1 tablet 100 tablet 3         2017

019 Discontinued



          w/FA tablet by mouth                                          



                    daily.                                            

 

          docusate calcium Take 1 capsule 60 capsule 1         2017 Discontinued



          240 mg capsule by mouth once                                         



                    daily as                                          



                    needed for                                         



                    Constipation.                                         

 

          ibuprofen 600 mg Take 1 tablet 30 tablet 1         2017

19 Discontinued



          tablet    by mouth every                                         



                    6 (six) hours                                         



                    as needed for                                         



                    Pain (scale                                         



                    1-3) or Pain                                         



                    (scale 4-6)                                         



                    (Pain). Take                                         



                    with food or                                         



                    milk.                                             

 

          HYDROcodone-aceta Take 1 tablet 20 tablet 0         2017

019 Discontinued



          minophen 5-325 mg by mouth every                                      

   



          tablet    6 (six) hours                                         



                    as needed for                                         



                    Pain (scale                                         



                    4-6) or Pain                                         



                    (scale 7-10).                                         

 

          ferrous sulfate Take 1 tablet 90 tablet 2         2017

9 Discontinued



          325 mg (65 mg by mouth 3                                         



          iron) tablet (three) times                                         



                    daily with                                         



                    meals.                                            

 

          foLIC acid 1 mg Take 1 tablet 30 tablet 3         2017

9 Discontinued



          tablet    by mouth                                          



                    daily.                                            









          Hospital, Clinic, or Other Ordered Dose Route     Frequency Start Date

 End Date  

Status



          Facility Administered                                                 

  



          Medication                                                   

 

          medroxyPROGESTERone 150 mg    IM        [id number]  2019          

 Active



          (DEPO-PROVERA) injection 150                                          

         



          mgIndications: Encounter for                                          

         



          management and injection of                                           

        



          depo-Provera                                                   



documented as of this encounter (statuses as of 08/15/2019)



Active Problems

No known active problemsdocumented as of this encounter (statuses as of 
08/15/2019)



Resolved Problems







                    Problem             Noted Date          Resolved Date

 

                    Anemia due to blood loss, acute 2017

18

 

                    Pregnancy with 37 weeks completed gestation 2017

 

                    Premature labor     2017

 

                    Meconium in amniotic fluid 2017

 

                    37 weeks gestation of pregnancy 2017          04/15/20

19

 

                    Liveborn by  2017

 

                    Fetus abdominal wall defect, fetus 1 2017

 

                    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



documented as of this encounter (statuses as of 08/15/2019)



Immunizations







                    Name                Administration Dates Next Due

 

                    Influenza Virus Vaccine Quad IM 3+ YRS 2017          



documented as of this encounter



Social History







             Tobacco Use  Types        Packs/Day    Years Used   Date

 

             Former Smoker                                        Quit: 04/15/20

17









                Smokeless Tobacco: Never Used                                 









                Alcohol Use     Drinks/Week     oz/Week         Comments

 

                Yes                                             socially









                          Sex Assigned at Birth     Date Recorded

 

                          Not on file               









                    Job Start Date      Occupation          Industry

 

                    Not on file         Not on file         Not on file









                    Travel History      Travel Start        Travel End









                                        No recent travel history available.



documented as of this encounter



Last Filed Vital Signs







                Vital Sign      Reading         Time Taken      Comments

 

                Blood Pressure  115/73          2019  2:37 PM CDT 

 

                Pulse           94              2019  2:37 PM CDT 

 

                Temperature     37.2 C (98.9 F) 2019  2:37 PM CDT 

 

                Respiratory Rate 16              2019  2:37 PM CDT 

 

                Oxygen Saturation -               -               

 

                Inhaled Oxygen Concentration -               -               

 

                Weight          54.1 kg (119 lb 4 oz) 2019  2:37 PM CDT 

 

                Height          149.9 cm (4' 11") 2019  2:37 PM CDT 

 

                Body Mass Index 24.09           2019  2:37 PM CDT 



documented in this encounter



Progress Notes

Albert Caraballo, BHAVANIP - 2019  2:45 PM CDTFormatting of this note might 
be different from the original.

Chief complaint:

Chief Complaint

Patient presents with

 NEXPLANON

  check



HPI Patient is a  here for Nexplanon check. Patient denies any complaints. 
Pating desires to continue with Nexplanon as BCM.



Histories

OB History

 Para Term  AB Living

2 2 2 0 0 2

SAB TAB Ectopic Multiple Live Births

        2



# Outcome Date GA Lbr [place]/2nd Weight Sex Delivery Anes PTL Lv

2 Term 19 37w0d   F  SEC   ANGELICA

1 Term 17 37w2d   M  SEC   ANGELICA



Obstetric Comments

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



Past Medical History:

Diagnosis Date

 Menstrual disorder 

 Ovarian cyst

 Scoliosis

 Syphilis

 treated



Family History

Problem Relation Age of Onset

 No Significant Medical Problems Mother

 Hypertension Father

 No Significant Medical Problems Sister

 Asthma Brother

 Diabetes Maternal Aunt

 Diabetes Maternal Uncle

 No Significant Medical Problems Paternal Aunt

 No Significant Medical Problems Paternal Uncle

 Breast Cancer Maternal Grandmother

 Diabetes Maternal Grandmother

 Diabetes Maternal Grandfather

 Diabetes Paternal Grandmother

 Hypertension Paternal Grandmother

 High cholesterol Paternal Grandmother



Family Status

Relation Name Status

 Mo  Alive

 Fa  Alive

 Sis  Alive

 Bro  Alive

 MAunt  Alive

 MUnc  Alive

 PAunt  Alive

 PUnc  Alive

 MGMo  

 MGFa  Alive

 PGMo  Alive

 PGFa  Alive



Past Surgical History:

Procedure Laterality Date

 APPENDECTOMY  

 with subsequent rupture

  SECTION N/A 2017

 Surgeon: Amilcar Gold MD;  Location: [facility] 
[facility]ivery OR Location



Social History



Socioeconomic History

 Marital status: Single

  Spouse name: Not on file

 Number of children: Not on file

 Years of education: Not on file

 Highest education level: Not on file

Occupational History

 Not on file

Social Needs

 Financial resource strain: Not on file

 Food insecurity:

  Worry: Not on file

  Inability: Not on file

 Transportation needs:

  Medical: Not on file

  Non-medical: Not on file

Tobacco Use

 Smoking status: Former Smoker

  Last attempt to quit: 4/15/2017

  Years since quittin.3

 Smokeless tobacco: Never Used

Substance and Sexual Activity

 Alcohol use: Yes

  Comment: socially

 Drug use: No

  Types: Marijuana

  Comment: quit 5 yrs ago

 Sexual activity: Yes

  Partners: Male

  Birth control/protection: Coitus interruptus

  Comment: last sexual intercourse 2019

Lifestyle

 Physical activity:

  Days per week: Not on file

  Minutes per session: Not on file

 Stress: Not on file

Relationships

 Social connections:

  Talks on phone: Not on file

  Gets together: Not on file

  Attends Oriental orthodox service: Not on file

  Active member of club or organization: Not on file

  Attends meetings of clubs or organizations: Not on file

  Relationship status: Not on file

 Intimate partner violence:

  Fear of current or ex partner: Not on file

  Emotionally abused: Not on file

  Physically abused: Not on file

  Forced sexual activity: Not on file

Other Topics Concern

 Not on file

Social History Narrative

 Patient lives with boyfriend and children.



Social History



Substance and Sexual Activity

Sexual Activity Yes

 Partners: Male

 Birth control/protection: Coitus interruptus

 Comment: last sexual intercourse 2019







Labs

No new labs



Radiology

No new radiology.



Allergies

Thomas is allergic to amoxicillin.



Medications

Thomas has a current medication list which includes the following Facility-
Administered Medications:medroxyprogesterone.



Review of Systems

Constitutional: Negative for activity change, appetite change, fatigue, 
unexpected weight change, weight gain and weight loss.

HENT: Negative for sore throat.

Eyes: Negative for visual disturbance.

Respiratory: Negative for cough and shortness of breath.

Breasts: Negative for discharge, mass, pain and unequal size.

Cardiovascular: Negative for chest pain, palpitations and leg swelling.

Gastrointestinal: Negative.  Negative for abdominal pain, anal bleeding, blood 
in stool, constipation, diarrhea, nausea, rectal pain and vomiting.

Genitourinary: Negative for bladder incontinence, dysuria, urgency, flank pain, 
vaginal bleeding, vaginal discharge, genital sores, vaginal pain and pelvic 
pain.

Skin: Negative for color change and rash.

Neurological: Negative.  Negative for dizziness, syncope and headaches.

Psychiatric/Behavioral: Negative for confusion, self-injury and sleep 
disturbance. The patient is not nervous/anxious.

Hematological: Negative for cold intolerance and heat intolerance.

Endocrine: Negative for hair loss, cold intolerance, heat intolerance, weight 
gain and weight loss.



/73 (BP Location: Right arm, Patient Position: Sitting, BP CUFF SIZE: 
Adult Small)  | Pulse 94 | Temp 37.2 C (98.9 F) (Oral)  | Resp 16  | Ht 4' 
11" (1.499 m)  | Wt 119 lb 4 oz (54.1 kg)  | LMP 2019 (Approximate)  | BMI
24.09 kg/m

Pregravid BMI: Could not be calculated



Physical Exam

Vitals reviewed.

Constitutional: She is oriented to person, place, and time. She appears well-
developed and well-nourished. Her body habitus is normal.

Cardiovascular: Regular rate and rhythm. No peripheral edema present.

Pulmonary/Chest: Normal inspiratory effort.

Neuro/Psychiatric: Inappropriate mood and affect. She is oriented to person, 
place, and time.

Skin: Skin normal. No lesion, no rash and no ulceration present.





Nexplanon Palpated in right hand.







Assessment/Plan

Nexplanon in place  (primary encounter diagnosis)

Comment: Nexplanon palpated in right arm.

Plan: Patient desires to continue with Nexplanon contraception. Provider has 
reviewed risks, benefits and alternatives contraception methods. Provider has 
also reviewed use, side effects and effectiveness of desires BCM vs other BCM.  
Encouraged abstinence until menses.  Encouraged use of condoms as back up x 1 
month and for safer sex.



Return to clinic for WWE.

Discussed treatment options.

Medications as ordered.

Reviewed patient instructions and provided printed copy.



This visit did not involve counseling and coordination that comprised more than 
50% of the visit time.

ANDREA Sanchez  8/15/2019  8:59 AM

Electronically signed by Albert Caraballo FNP at 08/15/2019  8:59 AM CDT
documented in this encounter



Plan of Treatment







             Date         Type         Specialty    Care Team    Description

 

                2019      Office Visit    [facility]   [facility]



                                        



                                                                [address] [place], [place] [address]

[address]



                                        



                                                                [phone] (Fax) 









                Health Maintenance Due Date        Last Done       Comments

 

                MENINGOCOCCAL B VACCINES ( of 2006                      



                2 - Risk Bexsero 2-dose                                 



                series)                                         

 

                VARICELLA VACCINES (1 of 2 - 2009                      



                13+ 2-dose series)                                 

 

                HPV VACCINES (1 - Female 2011                      



                3-dose series)                                  

 

                DTaP,Tdap,and Td Vaccines (1 - 2015                      



                Tdap)                                           

 

                INFLUENZA VACCINE (#1) 2019      

 

                CHLAMYDIA SCREENING 04/15/2020      04/15/2019,     



                                                2019,     



                                                2016      

 

                PAP SMEAR       04/15/2022      04/15/2019      

 

                PNEUMOCOCCAL 0-64 YEARS Aged Out                        No longe

r eligible based



                COMBINED SERIES                                 on patient's age

 to



                                                                complete this to

pic



documented as of this encounter



Results

Not on filedocumented in this encounter



Visit Diagnoses







                                        Diagnosis

 

                                        Nexplanon in place - Primary







                                        Presence of subdermal contraceptive henry

ce



documented in this encounter



Insurance







          Payer     Benefit Plan Subscriber ID Effective Phone     Address   Typ

e



                    / Group             Dates                         

 

          [facility] xxxxxxxxx 2019-Cindy [phone]   

Medicaid



           WOMEN                            [address]



                                        



                                                            [place], [place] 



                                                            [address] 









           Guarantor Name Account Type Relation to Date of Birth Phone      Bill

ing



                                 Patient                          Address

 

           Thomas Chung [facility]      Self       1995 [phone] [address]                                       (Home)     [place], [place]



                                                                  [address]



documented as of this encounter



Advance Directives







                Name            Relationship    Healthcare Agent Communication



                                                Relationship    

 

                Curt Randhawa    Significant Other Primary healthcare agent [phone] 



                                                                (Mobile)[phone] (Home)

## 2020-04-08 NOTE — XMS REPORT
Patient Summary Document

                            Created on:2020



Patient:KEYUR TURNER

Sex:Female

:1995

External Reference #:[id number]





Demographics







                          Address                   [address] 



                                                    [place] [address]

 

                          Home Phone                [phone]

 

                          Work Phone                [phone]

 

                          Preferred Language        Unknown

 

                          Marital Status            Unknown

 

                          [employer] Affiliation     Unknown

 

                          Race                      Unknown

 

                          Additional Race(s)        Unavailable



                                                    Unavailable

 

                          Ethnic Group              Unknown









Author







                          Organization              [facility]

t

 

                          Address                   [address]



                                                    [place] [address]

 

                          Phone                     [phone]









Support







                Name            Relationship    Address         Phone

 

                YUE          Unavailable     UNKNOWN         [phone]



                                                [place] [address] 

 

                YUE          Unavailable     UNKNOWN         [phone]



                                                [place] [address] 

 

                YUE          Unavailable     [address]   [phone]



                                                [place] [address] 

 

                GARCIA           Unavailable     [address]   [phone]



                                                [place] [address] 









Care Team Providers







                    Name                Role                Phone

 

                    Unavailable         Unavailable         Unavailable









Payers







             Payer Name   Policy Type  Policy Number Effective Date Expiration D

ate







Problems

This patient has no known problems.



Allergies, Adverse Reactions, Alerts







        Allergy Name Allergy Status  Severity Reaction(s) Onset   Inactive Treat

ing 

Comments



                Type                            Date    Date    Clinician 

 

        amoxicillin DA      Active  MO              2019                     



                                                00:00:0                 



                                                0                       

 

        amoxicillin DA      Active  MO              2019                     



                                                00:00:0                 



                                                0                       







Medications

This patient has no known medications.



Results







           Test Description Test Time  Test Comments Text Results Atomic Results

 Result 

Comments









                HGB   HCT       2019 21:09:00                 









                          Test Item    Value        Reference Range Comments









                HEMOGLOBIN (test code = HGB) 8.7 g/dL        10.7-13.9       

 

                HEMATOCRIT (test code = HCT) 28.4 %          32.1-42.1       



AG HEPATITIS B [id number] 16:47:00





                Test Item       Value           Reference Range Comments

 

                AG HEPATITIS B SURFACE (test code = HBSAG) NONREACTIVE     NONRE

ACTIVE     



IS CONSENT FORM SIGNED FOR HIV TESTING? YAB HEPATITIS C [id number] 
16:47:00





                Test Item       Value           Reference Range Comments

 

                AB HEPATITIS C (test code = HCVAB) NONREACTIVE     NONREACTIVE  

   

 

                SIGNAL TO CUTOFF (test code = CUTOFF) 0.10            <0.80     

      



IS CONSENT FORM SIGNED FOR HIV TESTING? YAB [id number] 16:47:00





                Test Item       Value           Reference Range Comments

 

                AB TREPONEMA (test code = TREPAB) NONREACTIVE     NONREACTIVE   

  



IS CONSENT FORM SIGNED FOR HIV TESTING? YAB HIV 1    16:47:00





                Test Item       Value           Reference Range Comments

 

                AB HIV 1   2 (test code = NONREACTIVE     NONREACTIVE     Done b

y Siemens Centaur 4th



                [id number])                                        Gen HIV Ag/Ab Co

mbo Screen



IS CONSENT FORM SIGNED FOR HIV TESTING? YCBC W/AUTO [id number] 15:28:00





                Test Item       Value           Reference Range Comments

 

                WHITE BLOOD CELL (test code = WBC) 11.8 K/mm3      6.6-12.1     

   

 

                RED BLOOD CELL (test code = RBC) 3.79 M/mm3      3.45-5.01      

 

 

                HEMOGLOBIN (test code = HGB) 9.1 g/dL        10.7-13.9       

 

                HEMATOCRIT (test code = HCT) 29.3 %          32.1-42.1       

 

                MEAN CELL VOLUME (test code = MCV) 77 fL           84.1-94.8    

   

 

                MEAN CELL HGB (test code = MCH) 24.0 pg         27-35           

 

                MEAN CELL HGB CONCETRATION (test code = MCHC) 31.1 gm/dL      32

.2-34.1       

 

                RED CELL DISTRIBUTION WIDTH (test code = RDW) 14.7 %          12

.4-16.5       

 

                PLATELET COUNT (test code = PLT) 223 K/mm3       133-385        

 

 

                IMMATURE PLATELET FRACTION (test code = IPF) 0.0 %           0.0

-10.8        

 

                MEAN PLATELET VOLUME (test code = MPV) 11.2 fl         9.1-12.7 

       

 

                NEUTROPHIL % (test code = NT%) 71.9 %          56.5-79.4       

 

                LYMPHOCYTE % (test code = LY%) 16.4 %          14.3-34.3       

 

                MONOCYTE % (test code = MO%) 7.6 %           5.1-10.4        

 

                EOSINOPHIL % (test code = EO%) 1.7 %           0.1-3.0         

 

                BASOPHIL % (test code = BA%) 0.3 %           0.1-1.0         

 

                NEUTROPHIL # (test code = NT#) 8.5 K/mm3                       

 

                LYMPHOCYTE # (test code = LY#) 1.9 K/mm3                       

 

                MONOCYTE # (test code = MO#) 0.9 K/mm3                       

 

                EOSINOPHIL # (test code = EO#) 0.20 K/mm3                      

 

                BASOPHIL # (test code = BA#) 0.0 K/mm3                       

 

                RBC MORPHOLOGY REQUIRED (test code = RBCM) NORMAL          PRINCE

L          

 

                PLATELET MORPHOLOGY REQUIRED (test code = PLTMR) NORMAL         

 NORMAL

## 2020-04-09 VITALS — DIASTOLIC BLOOD PRESSURE: 81 MMHG | SYSTOLIC BLOOD PRESSURE: 118 MMHG

## 2020-04-09 VITALS — OXYGEN SATURATION: 100 % | TEMPERATURE: 97.7 F

## 2020-04-09 NOTE — EDPHYS
Physician Documentation                                                                           

 [facility]                                                                 

Name: Thomas Chung                                                                               

Age: 25 yrs                                                                                       

Sex: Female                                                                                       

: 1995                                                                                   

MRN: [id number]                                                                                   

Arrival Date: 2020                                                                          

Time: 21:50                                                                                       

Account#: [id number]                                                                            

Bed 20                                                                                            

Private MD:                                                                                       

ED Physician Miguel Dempsey                                                                         

HPI:                                                                                              

                                                                                             

22:16 This 25 yrs old  Female presents to ER via Ambulatory with complaints of        rn  

      Dizziness, Headache, Vision Problem.                                                        

22:16 The patient presents with lightheadedness. The patient presents with feeling faint.     rn  

      Onset: The symptoms/episode began/occurred just prior to arrival. Context: occurred at      

      home, occurred while the patient was standing. Modifying factors: The symptoms are          

      alleviated by nothing, the symptoms are aggravated by changing position. Severity of        

      symptoms: At their worst the symptoms were moderate in the emergency department the         

      symptoms have resolved. The patient has not experienced similar symptoms in the past.       

      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            

      syncope, no trauma, no fall. Now feels better, felt heart racing. No chest pain/sob/abd     

      pain. No vaginal bleeding. reports intermittent left sided headaches for 2-3 months,        

      had some pain around this episode as well. .                                                

                                                                                                  

OB/GYN:                                                                                           

21:55 LMP 3/27/2020                                                                           ca1 

                                                                                                  

Historical:                                                                                       

- Allergies:                                                                                      

21:55 Amoxicillin;                                                                            ca1 

- Home Meds:                                                                                      

21:55 None [Active];                                                                          ca1 

- PMHx:                                                                                           

21:55 scoliosis;                                                                              ca1 

- PSHx:                                                                                           

21:55 Appendectomy; ;                                                                ca1 

                                                                                                  

- Immunization history:: Adult Immunizations up to date, Flu vaccine is up to date.               

- Social history:: Smoking status: Patient denies any tobacco usage or history of.                

- Family history:: not pertinent.                                                                 

- Hospitalizations: : No recent hospitalization is reported.                                      

                                                                                                  

                                                                                                  

ROS:                                                                                              

22:16 Constitutional: Negative for fever, chills, and weight loss, Eyes: Negative for injury, rn  

      pain, redness, and discharge, Neck: Negative for injury, pain, and swelling,                

      Cardiovascular: Negative for chest pain, and edema, Respiratory: Negative for shortness     

      of breath, cough, wheezing, and pleuritic chest pain, Abdomen/GI: Negative for              

      abdominal pain, nausea, vomiting, diarrhea, and constipation, Back: Negative for injury     

      and pain, : Negative for injury, bleeding, discharge, and swelling, MS/Extremity:         

      Negative for injury and deformity, Skin: Negative for injury, rash, and discoloration,      

      Neuro: Negative for numbness, tingling, and seizure.                                        

                                                                                                  

Exam:                                                                                             

22:16 Constitutional:  This is a well developed, well nourished patient who is awake, alert,  rn  

      and in no acute distress. Head/Face:  Normocephalic, atraumatic. Eyes:  Pupils equal        

      round and reactive to light, extra-ocular motions intact.  Lids and lashes normal.          

      Conjunctiva and sclera are non-icteric and not injected.  Cornea within normal limits.      

      Periorbital areas with no swelling, redness, or edema. Neck:  Trachea midline, no           

      thyromegaly or masses palpated, and no cervical lymphadenopathy.  Supple, full range of     

      motion without nuchal rigidity, or vertebral point tenderness.  No Meningismus.             

      Cardiovascular:  Regular rate and rhythm.  No pulse deficits. Respiratory:  Speaking        

      full sentences.  No increased work of breathing, no retractions or nasal flaring.           

      Abdomen/GI:  soft, non-tender Skin:  Warm, dry MS/ Extremity:  Pulses equal, no             

      cyanosis.  Neurovascular intact.  Full, normal range of motion.  Equal circumference.       

      Neuro:  Awake and alert, GCS 15, oriented to person, place, time, and situation.            

      Cranial nerves II-XII grossly intact.  Motor strength 5/5 in all extremities.  Sensory      

      grossly intact.  Cerebellar exam normal.                                                    

22:20 ECG was reviewed by the Attending Physician.                                            rn  

                                                                                                  

Vital Signs:                                                                                      

21:51  / 86; Pulse 91; Resp 17 S; Temp 97.7(TE); Pulse Ox 100% on R/A; Weight 55.79 kg  ca1 

      (R); Height 4 ft. 11 in. (149.86 cm) (R); Pain 7/10;                                        

23:57  / 76; Pulse 89; Resp 16 S; Pulse Ox 100% on R/A;                                 jd3 

                                                                                             

01:26  / 81; Pulse 92; Resp 17 S; Pulse Ox 100% on R/A;                                 jd3 

                                                                                             

21:51 Body Mass Index 24.84 (55.79 kg, 149.86 cm)                                             ca1 

                                                                                                  

MDM:                                                                                              

                                                                                             

21:57 Patient medically screened.                                                             rn  

                                                                                             

01:25 Differential diagnosis: cardiac arrhythmia, generalized weakness, hypovolemia,          rn  

      idiopathic dizziness, near-syncope, PE, early infection, migraine. Data reviewed: vital     

      signs, nurses notes, lab test result(s), EKG, radiologic studies, CT scan, and as a         

      result, I will discharge patient. Counseling: I had a detailed discussion with the          

      patient and/or guardian regarding: the historical points, exam findings, and any            

      diagnostic results supporting the discharge/admit diagnosis, lab results, radiology         

      results, the need for outpatient follow up, to return to the emergency department if        

      symptoms worsen or persist or if there are any questions or concerns that arise at          

      home. Response to treatment: the patient's condition has returned to base line, the         

      patient is now symptom free, and as a result, I will discharge patient. Special             

      discussion: I discussed with the patient/guardian in detail that at this point there is     

      no indication for admission to the hospital. It is understood, however, that if the         

      symptoms persist or worsen the patient needs to return immediately for re-evaluation.       

                                                                                                  

                                                                                             

22:11 Order name: CBC with Diff; Complete Time: 23:07                                         rn  

                                                                                             

22:11 Order name: Basic Metabolic Panel; Complete Time: 23:07                                 rn  

                                                                                             

22:11 Order name: Urine Microscopic Only; Complete Time: 23:07                                rn  

                                                                                             

22:11 Order name: D-Dimer; Complete Time: 23:07                                               rn  

                                                                                             

23:13 Order name: Urine Pregnancy--Ancillary (enter results); Complete Time: 00:19              

                                                                                             

23:13 Order name: Urine Dipstick--Ancillary (enter results); Complete Time: 00:19               

                                                                                             

22:11 Order name: CT Head Brain wo Cont                                                       rn  

                                                                                             

22:11 Order name: IV Start; Complete Time: 22:34                                              rn  

                                                                                             

22:11 Order name: Urine Pregnancy Test (obtain specimen); Complete Time: 22:15                rn  

                                                                                             

22:11 Order name: Urine Dipstick-Ancillary (obtain specimen); Complete Time: 22:15            rn  

                                                                                             

22:11 Order name: EKG; Complete Time: 22:12                                                   rn  

                                                                                             

22:11 Order name: EKG - Nurse/Tech; Complete Time: 22:15                                      rn  

                                                                                             

23:08 Order name: CT Chest For PE Angio                                                       rn  

                                                                                                  

EC/08                                                                                             

22:20 Rate is 88 beats/min. Rhythm is regular. QRS Axis is Normal. MD interval is normal. QRS rn  

      interval is normal. QT interval is normal. No Q waves. T waves are Normal. No ST            

      changes noted. Clinical impression: Normal ECG. Interpreted by me. Reviewed by me.          

                                                                                                  

Administered Medications:                                                                         

22:37 Drug: NS 0.9% 1000 ml Route: IV; Rate: 1000 ml; Site: right antecubital;                jd3 

23:35 Follow up: Response: No adverse reaction; IV Status: Completed infusion; IV Intake:     jd3 

      1000ml                                                                                      

                                                                                                  

                                                                                                  

Disposition:                                                                                      

20 01:27 Discharged to Home. Impression: Near syncope.                                      

- Condition is Stable.                                                                            

- Discharge Instructions: Near-Syncope.                                                           

                                                                                                  

- Medication Reconciliation Form, Thank You Letter, Antibiotic Education, Prescription            

  Opioid Use form.                                                                                

- Follow up: Private Physician; When: As needed; Reason: Recheck today's complaints,              

  Re-evaluation by your physician.                                                                

- Problem is new.                                                                                 

- Symptoms have improved.                                                                         

                                                                                                  

                                                                                                  

                                                                                                  

Signatures:                                                                                       

Dispatcher [employer]                           EDMS                                                 

Miguel Dempsey MD MD rn Davies, Jonathon, RN RN jd3 Acob, KATHERINE Wesley RN   [facility]                                                  

                                                                                                  

Corrections: (The following items were deleted from the chart)                                    

                                                                                             

01:40 01:27 2020 01:27 Discharged to Home. Impression: Near syncope. Condition is       jd3 

      Stable. Forms are Medication Reconciliation Form, Thank You Letter, Antibiotic              

      Education, Prescription Opioid Use. Follow up: Private Physician; When: As needed;          

      Reason: Recheck today's complaints, Re-evaluation by your physician. Problem is new.        

      Symptoms have improved. rn                                                                  

                                                                                                  

**************************************************************************************************

## 2020-04-09 NOTE — RAD REPORT
EXAM DESCRIPTION:  CT - Chest For Pe Angio - 4/9/2020 4:00 am

 

CLINICAL HISTORY:     The patient is 25 years old and is Female; palpitations, near syncope, elevated
 ddimer

 

TECHNIQUE:  Axial computed tomographic angiography images of the chest with intravenous contrast.   S
agittal and coronal reformatted images were created and reviewed.   This CT exam was performed using 
one or more of the following dose reduction techniques:   automated exposure control, adjustment of t
he mA and/or kV according to patient size, and/or use of iterative reconstruction technique.

   MIP reconstructed images were created and reviewed.

 

COMPARISON:

   No relevant prior studies available.

 

FINDINGS:  PULMONARY ARTERIES:   Unremarkable.   No pulmonary embolism.

 

   AORTA:   No acute findings.   No thoracic aortic aneurysm.

   LUNGS:   Unremarkable.   No mass.   No consolidation.

   PLEURAL SPACE:   Unremarkable.   No significant effusion.   No pneumothorax.

   HEART:   Unremarkable.   No cardiomegaly.   No significant pericardial effusion.   No evidence of 
RV dysfunction.

   BONES/JOINTS:   No acute fracture.   No dislocation.

   SOFT TISSUES:   Unremarkable.

   LYMPH NODES:   Unremarkable.   No enlarged lymph nodes.

 

IMPRESSION:  Normal chest CTA.   No pulmonary embolism.

 

Electronically signed by:   Christa Padron MD   4/9/2020 1:13 AM CDT Workstation: [id number]

 

 

 

Due to temporary technical issues with the PACS/Fluency reporting system, reports are being signed by
 the in house radiologist as a courtesy to ensure prompt reporting. The interpreting radiologist is serena gongly responsible for the content of the report.

## 2020-04-09 NOTE — RAD REPORT
EXAM DESCRIPTION:  CT - Head Brain Wo Cont - 4/9/2020 3:58 am

 

CLINICAL HISTORY:  Weakness and dizziness. Headache.

 

COMPARISON:  None.

 

TECHNIQUE:  CT scan of the brain without   IV contrast. This exam was performed according to our depa
rtmental dose-optimization program, which includes automated exposure control, adjustment of the mA a
nd/or kV according to patient size and/or use of iterative reconstruction technique.

 

FINDINGS:

The ventricles, cisterns, and sulci are age-appropriate. No evidence of acute infarction, intracrania
l hemorrhage, extra-axial fluid collection, or midline shift. No air-fluid levels are seen in the par
anasal sinuses to suggest acute sinusitis. No depressed skull fracture.

 

IMPRESSION:   No acute intracranial findings.

 

Electronically signed by:   Rajat Devries MD   4/8/2020 10:44 PM CDT Workstation: [id number]

 

 

Due to temporary technical issues with the PACS/Fluency reporting system, reports are being signed by
 the in house radiologist as a courtesy to ensure prompt reporting. The interpreting radiologist is f
ully responsible for the content of the report.

## 2020-04-09 NOTE — ER
Nurse's Notes                                                                                     

 [facility]                                                                 

Name: Thomas Chung                                                                               

Age: 25 yrs                                                                                       

Sex: Female                                                                                       

: 1995                                                                                   

MRN: [id number]                                                                                   

Arrival Date: 2020                                                                          

Time: 21:50                                                                                       

Account#: [id number]                                                                            

Bed 20                                                                                            

Private MD:                                                                                       

Diagnosis: Near syncope                                                                           

                                                                                                  

Presentation:                                                                                     

                                                                                             

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 

      heart was racing. Reports headache for couple months that starts at the back of the         

      eyes and goes down to the neck and ear. Reports seeing flashing lights with the             

      headache. Coronavirus screen: Proceed with normal triage. Patient denies a cough.           

      Patient denies shortness of breath or difficulty breathing. Patient denies measured         

      and/or subjective temperature greater than 100.4F prior to today's visit. Patient           

      denies travel on a cruise ship or to a country the [facility] currently lists as an affected       

      area. Patient denies contact with known and/or suspected case of COVID-19. Ebola            

      Screen: Patient negative for fever greater than or equal to 101.5 degrees Fahrenheit,       

      and additional compatible Ebola Virus Disease symptoms Patient denies exposure to           

      infectious person. Patient denies travel to an Ebola-affected area in the 21 days           

      before illness onset. No symptoms or risks identified at this time. Initial Sepsis          

      Screen: Does the patient meet any 2 criteria? No. Patient's initial sepsis screen is        

      negative. Does the patient have a suspected source of infection? No. Patient's initial      

      sepsis screen is negative. Risk Assessment: Do you want to hurt yourself or someone         

      else? Patient reports no desire to harm self or others. Onset of symptoms was 2020.                                                                                       

21:51 Method Of Arrival: Ambulatory                                                           ca1 

21:51 Acuity: IVETTE 3                                                                           ca1 

                                                                                                  

OB/GYN:                                                                                           

21:55 LMP 3/27/2020                                                                           ca1 

                                                                                                  

Historical:                                                                                       

- Allergies:                                                                                      

21:55 Amoxicillin;                                                                            ca1 

- Home Meds:                                                                                      

21:55 None [Active];                                                                          ca1 

- PMHx:                                                                                           

21:55 scoliosis;                                                                              ca1 

- PSHx:                                                                                           

21:55 Appendectomy; ;                                                                ca1 

                                                                                                  

- Immunization history:: Adult Immunizations up to date, Flu vaccine is up to date.               

- Social history:: Smoking status: Patient denies any tobacco usage or history of.                

- Family history:: not pertinent.                                                                 

- Hospitalizations: : No recent hospitalization is reported.                                      

                                                                                                  

                                                                                                  

Screenin:03 Abuse screen: Denies threats or abuse. Nutritional screening: No deficits noted.        jd3 

      Tuberculosis screening: No symptoms or risk factors identified. Fall Risk Ambulatory        

      Aid- None/Bed Rest/Nurse Assist (0 pts). Gait- Normal/Bed Rest/Wheelchair (0 pts)           

      Mental Status- Oriented to own ability (0 pts). Total Sanchez Fall Scale indicates No         

      Risk (0-24 pts).                                                                            

                                                                                                  

Assessment:                                                                                       

22:01 General: Appears in no apparent distress. uncomfortable, Behavior is calm, cooperative, jd3 

      appropriate for age. Pain: Complains of pain in head Quality of pain is described as        

      aching, pressure. Neuro: Level of Consciousness is awake, alert, obeys commands,            

      Oriented to person, place, time, situation, Pupils are PERRLA, Reports dizziness.           

      Cardiovascular: Denies chest pain, Heart tones S1 S2 present Capillary refill < 3           

      seconds Patient's skin is warm and dry. Respiratory: Airway is patent Respiratory           

      effort is even, unlabored, Respiratory pattern is regular, symmetrical, Breath sounds       

      are clear bilaterally. Denies cough, shortness of breath. GI: No signs and/or symptoms      

      were reported involving the gastrointestinal system. Patient currently denies               

      constipation, diarrhea, nausea, vomiting. : No signs and/or symptoms were reported        

      regarding the genitourinary system. EENT: Reports flashing lights and vision loss with      

      headache. Derm: Skin is intact, Skin is dry, Skin is normal, Skin temperature is warm.      

      Musculoskeletal: Circulation, motion, and sensation intact. Range of motion: intact in      

      all extremities.                                                                            

23:57 Reassessment: Patient appears in no apparent distress at this time. No changes from     jd3 

      previously documented assessment. Patient and/or family updated on plan of care and         

      expected duration. Pain level reassessed. Patient is alert, oriented x 3, equal             

      unlabored respirations, skin warm/dry/pink. awaiting results.                               

                                                                                             

01:25 Reassessment: Patient appears in no apparent distress at this time. Patient and/or      jd3 

      family updated on plan of care and expected duration. Pain level reassessed. Patient is     

      alert, oriented x 3, equal unlabored respirations, skin warm/dry/pink. awaiting results.    

01:37 Reassessment: Patient appears in no apparent distress at this time. Patient and/or      jd3 

      family updated on plan of care and expected duration. Pain level reassessed. Patient is     

      alert, oriented x 3, equal unlabored respirations, skin warm/dry/pink. reported             

      understanding of discharge instructions. Patient states feeling better.                     

                                                                                                  

Vital Signs:                                                                                      

                                                                                             

21:51  / 86; Pulse 91; Resp 17 S; Temp 97.7(TE); Pulse Ox 100% on R/A; Weight 55.79 kg  ca1 

      (R); Height 4 ft. 11 in. (149.86 cm) (R); Pain 7/10;                                        

23:57  / 76; Pulse 89; Resp 16 S; Pulse Ox 100% on R/A;                                 jd3 

                                                                                             

01:26  / 81; Pulse 92; Resp 17 S; Pulse Ox 100% on R/A;                                 jd3 

                                                                                             

21:51 Body Mass Index 24.84 (55.79 kg, 149.86 cm)                                             ca1 

                                                                                                  

ED Course:                                                                                        

                                                                                             

21:50 Patient arrived in ED.                                                                  es  

21:54 Triage completed.                                                                       ca1 

21:55 Arm band placed on right wrist.                                                         ca1 

21:57 Miguel Dempsey MD is Attending Physician.                                                rn  

22:00 Shaan Lundy RN is Primary Nurse.                                                  jd3 

22:03 Patient has correct armband on for positive identification. Bed in low position. Call   jd3 

      light in reach. Side rails up X 1. Cardiac monitor on. Pulse ox on. NIBP on.                

22:34 Inserted saline lock: 20 gauge in right antecubital area, using aseptic technique.      jd3 

      Blood collected.                                                                            

22:37 CT Head Brain wo Cont In Process Unspecified.                                           EDMS

                                                                                             

01:07 CT Chest For PE Angio In Process Unspecified.                                           EDMS

01:38 No provider procedures requiring assistance completed. IV discontinued, intact,         jd3 

      bleeding controlled, No redness/swelling at site. Pressure dressing applied.                

                                                                                                  

Administered Medications:                                                                         

                                                                                             

22:37 Drug: NS 0.9% 1000 ml Route: IV; Rate: 1000 ml; Site: right antecubital;                jd3 

23:35 Follow up: Response: No adverse reaction; IV Status: Completed infusion; IV Intake:     jd3 

      1000ml                                                                                      

                                                                                                  

                                                                                                  

Intake:                                                                                           

23:35 IV: 1000ml; Total: 1000ml.                                                              jd3 

                                                                                                  

Outcome:                                                                                          

                                                                                             

01:27 Discharge ordered by MD.                                                                rn  

01:39 Discharged to home ambulatory.                                                          jd3 

01:39 Condition: stable                                                                           

01:39 Discharge instructions given to patient, Instructed on discharge instructions, follow       

      up and referral plans. Demonstrated understanding of instructions, follow-up care.          

01:40 Patient left the ED.                                                                    jd3 

                                                                                                  

Signatures:                                                                                       

Dispatcher [employer]                           Ely Rajput Roman, MD MD rn Davies, Jonathon, RN                    RN   jd3                                                  

Maryjane Fuentes RN                        RN   ca1                                                  

                                                                                                  

**************************************************************************************************

## 2020-04-10 NOTE — EKG
Test Date:    2020-04-08               Test Time:    22:18:27

Technician:   MT                                     

                                                     

MEASUREMENT RESULTS:                                       

Intervals:                                           

Rate:         88                                     

AZ:           124                                    

QRSD:         84                                     

QT:           364                                    

QTc:          440                                    

Axis:                                                

P:            30                                     

AZ:           124                                    

QRS:          68                                     

T:            36                                     

                                                     

INTERPRETIVE STATEMENTS:                                       

                                                     

Normal sinus rhythm

Normal ECG

No previous ECG available for comparison



Electronically Signed On 04-10-20 07:35:01 CDT by Abdias Trujillo

## 2021-02-13 ENCOUNTER — HOSPITAL ENCOUNTER (EMERGENCY)
Dept: HOSPITAL 97 - ER | Age: 26
Discharge: HOME | End: 2021-02-13
Payer: SELF-PAY

## 2021-02-13 VITALS — SYSTOLIC BLOOD PRESSURE: 112 MMHG | TEMPERATURE: 98.2 F | DIASTOLIC BLOOD PRESSURE: 87 MMHG | OXYGEN SATURATION: 100 %

## 2021-02-13 DIAGNOSIS — R43.0: Primary | ICD-10-CM

## 2021-02-13 DIAGNOSIS — Z88.1: ICD-10-CM

## 2021-02-13 PROCEDURE — 99281 EMR DPT VST MAYX REQ PHY/QHP: CPT

## 2021-02-13 NOTE — ER
Nurse's Notes                                                                                     

 [facility]                                                                 

Name: Thomas Chung                                                                               

Age: 25 yrs                                                                                       

Sex: Female                                                                                       

: 1995                                                                                   

MRN: [id number]                                                                                   

Arrival Date: 2021                                                                          

Time: 06:45                                                                                       

Account#: [id number]                                                                            

Bed 4                                                                                             

Private MD:                                                                                       

Diagnosis: Anosmia                                                                                

                                                                                                  

Presentation:                                                                                     

                                                                                             

06:54 Chief complaint: Patient states: woke up at 4 AM and couldn't smell or taste, had       em  

      cough, and HA, denies fever. Coronavirus screen: Client denies travel out of the [place]       

      in the last 14 days. runny nose, loss of taste or smell, Client presents with at least      

      one sign or symptom that may indicate coronavirus-19. Standard/surgical mask placed on      

      the client. Provider contacted for isolation considerations. Ebola Screen: Patient          

      negative for fever greater than or equal to 101.5 degrees Fahrenheit, and additional        

      compatible Ebola Virus Disease symptoms Patient denies exposure to infectious person.       

      Patient denies travel to an Ebola-affected area in the 21 days before illness onset. No     

      symptoms or risks identified at this time. Initial Sepsis Screen: Does the patient meet     

      any 2 criteria? No. Patient's initial sepsis screen is negative. Does the patient have      

      a suspected source of infection? No. Patient's initial sepsis screen is negative. Risk      

      Assessment: Do you want to hurt yourself or someone else? Patient reports no desire to      

      harm self or others. Onset of symptoms was 2021.                               

06:54 Method Of Arrival: Ambulatory                                                           em  

06:54 Acuity: IVETTE 4                                                                           em  

                                                                                                  

Historical:                                                                                       

- Allergies:                                                                                      

06:58 Amoxicillin;                                                                            em  

- PMHx:                                                                                           

06:58 scoliosis;                                                                              em  

- PSHx:                                                                                           

06:58 Appendectomy; ;                                                                em  

                                                                                                  

- Immunization history:: Adult Immunizations up to date.                                          

- Social history:: Smoking status: Patient denies any tobacco usage or history of.                

                                                                                                  

                                                                                                  

Vital Signs:                                                                                      

06:54  / 87; Pulse 94; Resp 18; Temp 98.2(O); Pulse Ox 100% on R/A; Weight 53.07 kg;    em  

      Height 4 ft. 11 in. (149.86 cm); Pain 0/10;                                                 

06:54 Body Mass Index 23.63 (53.07 kg, 149.86 cm)                                             em  

                                                                                                  

ED Course:                                                                                        

06:45 Patient arrived in ED.                                                                  cl3 

06:52 Wale, Cayden, MD is Attending Physician.                                            tw4 

06:57 Triage completed.                                                                       em  

06:58 Arm band placed on.                                                                     em  

07:08 No provider procedures requiring assistance completed. Patient did not have IV access   em  

      during this emergency room visit.                                                           

                                                                                                  

Administered Medications:                                                                         

No medications were administered                                                                  

                                                                                                  

                                                                                                  

Outcome:                                                                                          

07:05 Discharge ordered by MD.                                                                tw4 

07:08 Medical screen evaluation completed per provider. Patient declined treatment.           em  

07:09 Following a medical screening exam, the patient was provided information regarding      em  

      alternative care sites and resources available per registration personnel.                  

07:09 Condition: good                                                                         em  

07:09 Patient left the ED.                                                                    em  

                                                                                                  

Signatures:                                                                                       

Ricky Joshi, RN                        RN   em                                                   

Cayden Echevarria MD MD   tw4                                                  

Ani Strange                                cl3                                                  

                                                                                                  

**************************************************************************************************

## 2021-02-13 NOTE — EDPHYS
Physician Documentation                                                                           

 [facility]                                                                 

Name: Thomas Chung                                                                               

Age: 25 yrs                                                                                       

Sex: Female                                                                                       

: 1995                                                                                   

MRN: [id number]                                                                                   

Arrival Date: 2021                                                                          

Time: 06:45                                                                                       

Account#: [id number]                                                                            

Bed 4                                                                                             

Private MD:                                                                                       

ED Physician Cayden Echevarria                                                                     

HPI:                                                                                              

                                                                                             

07:06 This 25 yrs old  Female presents to ER via Ambulatory with complaints of        tw4 

      Headache, Shortness Of Breath.                                                              

07:06 The patient complains of pain to the forehead. Onset: The symptoms/episode              tw4 

      began/occurred today. Associated signs and symptoms: The patient has no apparent            

      associated signs or symptoms. Severity of symptoms: At its worst the pain was moderate,     

      in the emergency department the pain is unchanged. The patient has not experienced          

      similar symptoms in the past.                                                               

                                                                                                  

Historical:                                                                                       

- Allergies:                                                                                      

06:58 Amoxicillin;                                                                            em  

- PMHx:                                                                                           

06:58 scoliosis;                                                                              em  

- PSHx:                                                                                           

06:58 Appendectomy; ;                                                                em  

                                                                                                  

- Immunization history:: Adult Immunizations up to date.                                          

- Social history:: Smoking status: Patient denies any tobacco usage or history of.                

                                                                                                  

                                                                                                  

ROS:                                                                                              

07:06 Constitutional: Negative for fever, chills, and weight loss, Eyes: Negative for injury, tw4 

      pain, redness, and discharge, Cardiovascular: Negative for chest pain, palpitations,        

      and edema, Respiratory: Negative for shortness of breath, cough, wheezing, and              

      pleuritic chest pain, Abdomen/GI: Negative for abdominal pain, nausea, vomiting,            

      diarrhea, and constipation, Back: Negative for injury and pain, MS/Extremity: Negative      

      for injury and deformity, Skin: Negative for injury, rash, and discoloration.               

                                                                                                  

Exam:                                                                                             

07:06 Constitutional:  This is a well developed, well nourished patient who is awake, alert,  tw4 

      and in no acute distress. Head/Face:  Normocephalic, atraumatic. Chest/axilla:  Normal      

      chest wall appearance and motion.  Nontender with no deformity.  No lesions are             

      appreciated. Cardiovascular:  Regular rate and rhythm with a normal S1 and S2.  No          

      gallops, murmurs, or rubs.  Normal PMI, no JVD.  No pulse deficits. Respiratory:  Lungs     

      have equal breath sounds bilaterally, clear to auscultation and percussion.  No rales,      

      rhonchi or wheezes noted.  No increased work of breathing, no retractions or nasal          

      flaring. Abdomen/GI:  Soft, non-tender, with normal bowel sounds.  No distension or         

      tympany.  No guarding or rebound.  No evidence of tenderness throughout. MS/ Extremity:     

       Pulses equal, no cyanosis.  Neurovascular intact.  Full, normal range of motion.           

      Neuro:  Awake and alert, GCS 15, oriented to person, place, time, and situation.            

      Cranial nerves II-XII grossly intact.  Motor strength 5/5 in all extremities.  Sensory      

      grossly intact.  Cerebellar exam normal.  Normal gait.                                      

                                                                                                  

Vital Signs:                                                                                      

06:54  / 87; Pulse 94; Resp 18; Temp 98.2(O); Pulse Ox 100% on R/A; Weight 53.07 kg;    em  

      Height 4 ft. 11 in. (149.86 cm); Pain 0/10;                                                 

06:54 Body Mass Index 23.63 (53.07 kg, 149.86 cm)                                             em  

                                                                                                  

MDM:                                                                                              

06:52 Patient medically screened.                                                             tw4 

07:06 Data reviewed: vital signs, nurses notes. Medical screen evaluation completed. EMTALA   tw4 

      emergency medical condition absent.                                                         

                                                                                                  

Administered Medications:                                                                         

No medications were administered                                                                  

                                                                                                  

                                                                                                  

Disposition:                                                                                      

21 07:05 Discharged to Home. Impression: Anosmia.                                           

- Condition is Stable.                                                                            

- Discharge Instructions: Viral Respiratory Infection.                                            

                                                                                                  

- Medication Reconciliation Form, Thank You Letter, Antibiotic Education, Prescription            

  Opioid Use form.                                                                                

- Follow up: Private Physician; When: Upon discharge from the Emergency Department;               

  Reason: Recheck today's complaints, Continuance of care, Re-evaluation by your                  

  physician.                                                                                      

- Problem is new.                                                                                 

- Symptoms have improved.                                                                         

                                                                                                  

                                                                                                  

                                                                                                  

Signatures:                                                                                       

Ricky Joshi RN                        RN   em                                                   

Cayden Echevarria MD MD   tw4                                                  

                                                                                                  

Corrections: (The following items were deleted from the chart)                                    

07:09 07:05 2021 07:05 Discharged to Home. Impression: Anosmia. Condition is Stable.    em  

      Forms are Medication Reconciliation Form, Thank You Letter, Antibiotic Education,           

      Prescription Opioid Use. Follow up: Private Physician; When: Upon discharge from the        

      Emergency Department; Reason: Recheck today's complaints, Continuance of care,              

      Re-evaluation by your physician. Problem is new. Symptoms have improved. tw4                

                                                                                                  

**************************************************************************************************

## 2022-07-03 ENCOUNTER — HOSPITAL ENCOUNTER (EMERGENCY)
Dept: HOSPITAL 97 - ER | Age: 27
Discharge: HOME | End: 2022-07-03
Payer: SELF-PAY

## 2022-07-03 DIAGNOSIS — Z88.1: ICD-10-CM

## 2022-07-03 DIAGNOSIS — R07.9: Primary | ICD-10-CM

## 2022-07-03 DIAGNOSIS — R10.9: ICD-10-CM

## 2022-07-03 DIAGNOSIS — F12.10: ICD-10-CM

## 2022-07-03 LAB
ALBUMIN SERPL BCP-MCNC: 4.1 G/DL (ref 3.4–5)
ALP SERPL-CCNC: 96 U/L (ref 45–117)
ALT SERPL W P-5'-P-CCNC: 18 U/L (ref 12–78)
AST SERPL W P-5'-P-CCNC: 8 U/L (ref 15–37)
BUN BLD-MCNC: 14 MG/DL (ref 7–18)
GLUCOSE SERPLBLD-MCNC: 100 MG/DL (ref 74–106)
HCT VFR BLD CALC: 39.1 % (ref 36–45)
LYMPHOCYTES # SPEC AUTO: 0.7 K/UL (ref 0.7–4.9)
MAGNESIUM SERPL-MCNC: 2.2 MG/DL (ref 1.8–2.4)
MCV RBC: 83.1 FL (ref 80–100)
METHAMPHET UR QL SCN: NEGATIVE
PMV BLD: 8.5 FL (ref 7.6–11.3)
POTASSIUM SERPL-SCNC: 2.9 MMOL/L (ref 3.5–5.1)
RBC # BLD: 4.71 M/UL (ref 3.86–4.86)
SP GR UR: 1.02 (ref 1–1.03)
THC SERPL-MCNC: POSITIVE NG/ML
TROPONIN I SERPL HS-MCNC: < 3 PG/ML (ref ?–58.9)

## 2022-07-03 PROCEDURE — 80307 DRUG TEST PRSMV CHEM ANLYZR: CPT

## 2022-07-03 PROCEDURE — 99284 EMERGENCY DEPT VISIT MOD MDM: CPT

## 2022-07-03 PROCEDURE — 80076 HEPATIC FUNCTION PANEL: CPT

## 2022-07-03 PROCEDURE — 80048 BASIC METABOLIC PNL TOTAL CA: CPT

## 2022-07-03 PROCEDURE — 84484 ASSAY OF TROPONIN QUANT: CPT

## 2022-07-03 PROCEDURE — 85025 COMPLETE CBC W/AUTO DIFF WBC: CPT

## 2022-07-03 PROCEDURE — 71045 X-RAY EXAM CHEST 1 VIEW: CPT

## 2022-07-03 PROCEDURE — 93005 ELECTROCARDIOGRAM TRACING: CPT

## 2022-07-03 PROCEDURE — 81003 URINALYSIS AUTO W/O SCOPE: CPT

## 2022-07-03 PROCEDURE — 74177 CT ABD & PELVIS W/CONTRAST: CPT

## 2022-07-03 PROCEDURE — 96374 THER/PROPH/DIAG INJ IV PUSH: CPT

## 2022-07-03 PROCEDURE — 96375 TX/PRO/DX INJ NEW DRUG ADDON: CPT

## 2022-07-03 PROCEDURE — 83735 ASSAY OF MAGNESIUM: CPT

## 2022-07-03 PROCEDURE — 81025 URINE PREGNANCY TEST: CPT

## 2022-07-03 PROCEDURE — 36415 COLL VENOUS BLD VENIPUNCTURE: CPT

## 2022-07-03 NOTE — EDPHYS
Physician Documentation                                                                           

 [facility]                                                                 

Name: Thomas Chung                                                                               

Age: 27 yrs                                                                                       

Sex: Female                                                                                       

: 1995                                                                                   

MRN: [id number]                                                                                   

Arrival Date: 2022                                                                          

Time: 19:57                                                                                       

Account#: [id number]                                                                            

Bed 20                                                                                            

Private MD:                                                                                       

ED Physician Patric Ma                                                                      

HPI:                                                                                              

                                                                                             

20:10 This 27 yrs old  Female presents to ER via Wheelchair with complaints of Chest  pm1 

      Pain, Shortness Of Breath.                                                                  

20:10 The patient or guardian reports chest pain that is located primarily in the mid-sternal pm1 

      area. The pain does not radiate. Associated signs and symptoms: Pertinent positives:        

      abdominal pain, nausea, shortness of breath, vomiting, Pertinent negatives: cough. The      

      chest pain is described as aching. Duration: The patient or guardian reports a single       

      episode, that is still ongoing. Modifying factors: The symptoms are alleviated by           

      nothing. the symptoms are aggravated by nothing. Severity of pain: in the emergency         

      department the pain is actually worse. The patient has not experienced similar symptoms     

      in the past. The patient has not recently seen a physician.                                 

                                                                                                  

OB/GYN:                                                                                           

20:08 LMP 2022                                                                           lp1 

                                                                                                  

Historical:                                                                                       

- Allergies:                                                                                      

20:08 Amoxicillin;                                                                            lp1 

- Home Meds:                                                                                      

20:08 None [Active];                                                                          lp1 

- PMHx:                                                                                           

20:08 scoliosis; Gastritis;                                                                   lp1 

- PSHx:                                                                                           

20:08 Appendectomy;  section;                                                         lp1 

                                                                                                  

- Immunization history:: Adult Immunizations up to date.                                          

- Social history:: Smoking status: Patient denies any tobacco usage or history of.                

  Patient uses street drugs, marijuana.                                                           

                                                                                                  

                                                                                                  

ROS:                                                                                              

20:10 Constitutional: Negative for fever, chills, and weight loss.                            pm1 

20:10 Back: Negative for injury and pain, MS/Extremity: Negative for injury and deformity,        

      Skin: Negative for injury, rash, and discoloration, Neuro: Negative for headache,           

      weakness, numbness, tingling, and seizure.                                                  

20:10 Cardiovascular: Positive for chest pain, Negative for edema, palpitations.                  

20:10 Respiratory: Positive for shortness of breath, Negative for cough.                          

20:10 Abdomen/GI: Positive for abdominal pain, nausea and vomiting, of the epigastric area,       

      Negative for diarrhea.                                                                      

20:10 All other systems are negative.                                                             

                                                                                                  

Exam:                                                                                             

20:10 Constitutional:  This is a well developed, well nourished patient who is awake, alert,  pm1 

      and in no acute distress. Head/Face:  Normocephalic, atraumatic.                            

20:10 Back:  No spinal tenderness.  No costovertebral tenderness.  Full range of motion.          

      Skin:  Warm, dry with normal turgor.  Normal color with no rashes, no lesions, and no       

      evidence of cellulitis. MS/ Extremity:  Pulses equal, no cyanosis.  Neurovascular           

      intact.  Full, normal range of motion.                                                      

20:10 Cardiovascular: Rate: tachycardic, Rhythm: regular, Pulses:                                 

20:10 Respiratory: Exam negative for  acute changes, respiratory distress, shortness of           

      breath, Breath sounds: are clear throughout.                                                

20:10 Abdomen/GI: Inspection: abdomen appears normal, Palpation: soft, in all quadrants, mild     

      abdominal tenderness, in the epigastric area.                                               

20:10 Neuro: Exam negative for acute changes, Orientation: is normal, Mentation: is normal,       

      Motor: is normal, moves all fours.                                                          

                                                                                                  

Vital Signs:                                                                                      

20:00  / 86; Pulse 107; Resp 18; Pulse Ox 100% on R/A;                                  vc1 

20:06  / 81; Pulse 96; Resp 12; Pulse Ox 100% on R/A; Weight 40.37 kg (R); Height 4 ft. lp1 

      11 in. (149.86 cm); Pain 10/10;                                                             

22:28 Temp 98.3(O);                                                                           vc1 

22:46 BP 98 / 59; Pulse 84; Resp 14; Pulse Ox 100% ;                                          vc1 

20:06 Body Mass Index 17.98 (40.37 kg, 149.86 cm)                                             lp1 

                                                                                                  

MDM:                                                                                              

20:05 Patient medically screened.                                                             pm1 

23:28 Data reviewed: vital signs. Data interpreted: Pulse oximetry: on room air is 100 %.     pm1 

      Interpretation: normal.                                                                     

23:43 Counseling: I had a detailed discussion with the patient and/or guardian regarding: the pm1 

      historical points, exam findings, and any diagnostic results supporting the                 

      discharge/admit diagnosis, lab results, radiology results, the need for outpatient          

      follow up, to return to the emergency department if symptoms worsen or persist or if        

      there are any questions or concerns that arise at home.                                     

                                                                                                  

                                                                                             

20:10 Order name: Basic Metabolic Panel; Complete Time: 21:09                                 pm1 

                                                                                             

20:10 Order name: CBC with Diff; Complete Time: 21:09                                         pm1 

                                                                                             

20:10 Order name: LFT's; Complete Time: 21:09                                                 pm1 

                                                                                             

20:10 Order name: Magnesium; Complete Time: 21:09                                             pm1 

                                                                                             

20:10 Order name: Troponin HS; Complete Time: 21:09                                           pm1 

                                                                                             

20:10 Order name: UDS; Complete Time: 21:53                                                   pm1 

                                                                                             

20:10 Order name: XRAY Chest (1 view); Complete Time: 22:17                                   pm1 

                                                                                             

20:10 Order name: CT Abd/Pelvis - IV Contrast Only; Complete Time: 22:17                      pm1 

                                                                                             

21:12 Order name: Urine Dipstick-Ancillary; Complete Time: 21:22                              EDMS

                                                                                             

21:14 Order name: Urine Dipstick-Ancillary                                                    EDMS

                                                                                             

21:16 Order name: Urine Pregnancy--Ancillary (enter results); Complete Time: 22:17              

                                                                                             

20:10 Order name: EKG; Complete Time: 20:11                                                   pm1 

                                                                                             

20:10 Order name: Cardiac monitoring; Complete Time: 20:29                                    pm1 

                                                                                             

20:10 Order name: EKG - Nurse/Tech; Complete Time: 20:29                                      pm1 

                                                                                             

20:10 Order name: IV Saline Lock; Complete Time: 20:29                                        pm1 

                                                                                             

20:10 Order name: Labs collected and sent; Complete Time: 20:29                               pm                                                                                             

20:10 Order name: O2 Per Protocol; Complete Time: 20:29                                       pm                                                                                             

20:10 Order name: O2 Sat Monitoring; Complete Time: 20:29                                     pm1 

                                                                                             

20:10 Order name: Urine Dipstick-Ancillary (obtain specimen); Complete Time: 21:13            pm1 

                                                                                             

20:10 Order name: Urine Pregnancy Test (obtain specimen); Complete Time: 21:13                pm1 

                                                                                                  

Administered Medications:                                                                         

20:39 Drug: NS 0.9% 1000 ml Route: IV; Rate: 1000 ml; Site: right antecubital;                vc1 

20:39 Drug: Pepcid (famotidine) 20 mg Route: IVP; Site: right antecubital;                    vc1 

22:35 Follow up: Response: No adverse reaction                                                vc1 

20:39 Drug: Zofran (Ondansetron) 4 mg Route: IVP; Site: right antecubital;                    vc1 

21:15 Follow up: Response: No adverse reaction; Marked relief of symptoms; Nausea is decreasedvc1 

21:22 Drug: Potassium Chloride 40 mEq Route: PO;                                              vc1 

22:34 Follow up: Response: No adverse reaction                                                vc1 

21:22 Drug: Ketorolac 15 mg Route: IVP; Site: right antecubital;                              vc1 

22:34 Follow up: Response: No adverse reaction; Marked relief of symptoms; Pain is decreased  vc1 

22:16 Drug: NS 0.9% 1000 ml Route: IV; Rate: 1000 ml; Site: right antecubital;                vc1 

22:29 Drug: Phenergan (promethazine) 12.5 mg Route: IVP; Site: right antecubital;             vc1 

                                                                                                  

                                                                                                  

Disposition Summary:                                                                              

22 23:44                                                                                    

Discharge Ordered                                                                                 

      Location: Home                                                                          pm1 

      Problem: new                                                                            pm1 

      Symptoms: have improved                                                                 pm1 

      Condition: Stable                                                                       pm1 

      Diagnosis                                                                                   

        - Cannabis abuse                                                                      pm1 

        - Abdominal pain, unspecified                                                         pm1 

        - Chest pain, unspecified                                                             pm1 

      Followup:                                                                               pm1 

        - With: Emergency Department                                                               

        - When: As needed                                                                          

        - Reason: Worsening of condition                                                           

      Followup:                                                                               pm1 

        - With: Private Physician                                                                  

        - When: 2 - 3 days                                                                         

        - Reason: Recheck today's complaints, Continuance of care, Re-evaluation by your           

      physician                                                                                   

      Discharge Instructions:                                                                     

        - Discharge Summary Sheet                                                             pm1 

        - Abdominal Pain, Adult                                                               pm1 

        - Nonspecific Chest Pain, Adult                                                       pm1 

        - Cannabis Use Disorder                                                               pm1 

        - Cannabinoid Hyperemesis Syndrome                                                    pm1 

      Forms:                                                                                      

        - Medication Reconciliation Form                                                      pm1 

        - Thank You Letter                                                                    pm1 

        - Antibiotic Education                                                                pm1 

        - Prescription Opioid Use                                                             pm1 

      Prescriptions:                                                                              

        - Pepcid 20 mg Oral Tablet                                                                 

            - take 1 tablet by ORAL route every 12 hours for 10 days; 20 tablet; Refills: 0,  pm1 

      Product Selection Permitted                                                                 

        - promethazine 25 mg Oral Tablet                                                           

            - take 1 tablet by ORAL route every 6 hours As needed; 20 tablet; Refills: 0,     pm1 

      Product Selection Permitted                                                                 

        - dicyclomine 20 mg Oral Tablet                                                            

            - take 1 tablet by ORAL route 4 times per day As needed; 20 tablet; Refills: 0,   pm1 

      Product Selection Permitted                                                                 

Signatures:                                                                                       

Dispatcher [employer]                           Jane Navarrete RN                         RN   lp1                                                  

Ilya Olmos NP                    NP   pm1                                                  

Calcote, Miranda, RN                    RN   vc1                                                  

Stephanie Nunez, PA                       PA   [id number]                                                  

                                                                                                  

**************************************************************************************************

## 2022-07-03 NOTE — ER
Nurse's Notes                                                                                     

 [facility]                                                                 

Name: Thomas Chung                                                                               

Age: 27 yrs                                                                                       

Sex: Female                                                                                       

: 1995                                                                                   

MRN: [id number]                                                                                   

Arrival Date: 2022                                                                          

Time: 19:57                                                                                       

Account#: [id number]                                                                            

Bed 20                                                                                            

Private MD:                                                                                       

Diagnosis: Cannabis abuse;Abdominal pain, unspecified;Chest pain, unspecified                     

                                                                                                  

Presentation:                                                                                     

                                                                                             

20:02 Acuity: IVETTE 3                                                                           lp1 

20:06 Chief complaint: Patient states: Chest pain x 2 hours with reported shortness of        lp1 

      breath, reports she has been vomiting all day today; Reports "it feels like something       

      is sitting on my chest". Coronavirus screen: vomiting. Ebola Screen: No symptoms or         

      risks identified at this time. Initial Sepsis Screen: Does the patient meet any 2           

      criteria? No. Patient's initial sepsis screen is negative. Does the patient have a          

      suspected source of infection? No. Patient's initial sepsis screen is negative. Risk        

      Assessment: Do you want to hurt yourself or someone else? Patient reports no desire to      

      harm self or others. Onset of symptoms was 2022.                                   

20:06 Method Of Arrival: Wheelchair                                                           lp1 

                                                                                                  

Triage Assessment:                                                                                

20:40 General: Appears in no apparent distress. uncomfortable, ill, slender, Behavior is      vc1 

      anxious. Pain: Complains of pain in chest Pain does not radiate. Pain currently is 10       

      out of 10 on a pain scale. Quality of pain is described as sharp, Noted to be crying,       

      grimacing. EENT: No deficits noted. Neuro: Level of Consciousness is awake, alert,          

      obeys commands, Oriented to person, place, time, situation, Appropriate for age.            

      Cardiovascular: Reports chest pain, nausea, shortness of breath. Respiratory: Airway is     

      patent Respiratory effort is even, unlabored, Respiratory pattern is regular,               

      symmetrical, the patient has mild shortness of breath. GI: Abdomen is flat, Pt is           

      actively vomiting clear fluid, Reports nausea, vomiting. : No deficits noted. Derm:       

      No deficits noted. Musculoskeletal: No signs and/or symptoms reported regarding the         

      musculoskeletal system.                                                                     

                                                                                                  

OB/GYN:                                                                                           

20:08 LMP 2022                                                                           lp1 

                                                                                                  

Historical:                                                                                       

- Allergies:                                                                                      

20:08 Amoxicillin;                                                                            lp1 

- Home Meds:                                                                                      

20:08 None [Active];                                                                          lp1 

- PMHx:                                                                                           

20:08 scoliosis; Gastritis;                                                                   lp1 

- PSHx:                                                                                           

20:08 Appendectomy;  section;                                                         lp1 

                                                                                                  

- Immunization history:: Adult Immunizations up to date.                                          

- Social history:: Smoking status: Patient denies any tobacco usage or history of.                

  Patient uses street drugs, marijuana.                                                           

                                                                                                  

                                                                                                  

Screenin:40 Abuse screen: Denies threats or abuse. Nutritional screening: No deficits noted.        vc1 

      Tuberculosis screening: No symptoms or risk factors identified. Fall Risk None              

      identified.                                                                                 

                                                                                                  

Assessment:                                                                                       

20:42 Reassessment: See triage assessment.                                                    vc1 

22:46 Reassessment: Patient and/or family updated on plan of care and expected duration. Pain vc1 

      level reassessed. Patient is alert, oriented x 3, equal unlabored respirations, skin        

      warm/dry/pink. Patient states feeling better. Patient states symptoms have improved.        

23:58 Reassessment: Patient and/or family updated on plan of care and expected duration. Pain vc1 

      level reassessed. Patient is alert, oriented x 3, equal unlabored respirations, skin        

      warm/dry/pink. Patient states feeling better. Patient states symptoms have improved.        

                                                                                                  

Vital Signs:                                                                                      

20:00  / 86; Pulse 107; Resp 18; Pulse Ox 100% on R/A;                                  vc1 

20:06  / 81; Pulse 96; Resp 12; Pulse Ox 100% on R/A; Weight 40.37 kg (R); Height 4 ft. lp1 

      11 in. (149.86 cm); Pain 10/10;                                                             

22:28 Temp 98.3(O);                                                                           vc1 

22:46 BP 98 / 59; Pulse 84; Resp 14; Pulse Ox 100% ;                                          vc1 

20:06 Body Mass Index 17.98 (40.37 kg, 149.86 cm)                                             lp1 

                                                                                                  

ED Course:                                                                                        

19:57 Patient arrived in ED.                                                                  vc1 

20:01 Ilya Olmos NP is PHCP.                                                           pm1 

20:01 Patric Ma MD is Attending Physician.                                             pm1 

20:02 Triage completed.                                                                       lp1 

20:08 Arm band placed on right wrist.                                                         lp1 

20:08 Patient has correct armband on for positive identification. Bed in low position. Client lp1 

      placed on continuous cardiac and pulse oximetry monitoring. NIBP monitoring applied.        

20:20 Miranda Plaza RN is Primary Nurse.                                                  vc1 

20:40 Inserted saline lock: 20 gauge in right antecubital area, using aseptic technique.      vc1 

      Blood collected.                                                                            

21:01 XRAY Chest (1 view) In Process Unspecified.                                             EDMS

21:10 Notified Nurse Practitioner and/or Physician Assistant of a critical lab result(s),     bb  

      potassium of 2.9 Ilya Olmos NP notified.                                               

21:36 CT Abd/Pelvis - IV Contrast Only In Process Unspecified.                                EDMS

23:58 No provider procedures requiring assistance completed. IV discontinued, intact,         vc1 

      bleeding controlled, No redness/swelling at site. Pressure dressing applied.                

                                                                                                  

Administered Medications:                                                                         

20:39 Drug: NS 0.9% 1000 ml Route: IV; Rate: 1000 ml; Site: right antecubital;                vc1 

20:39 Drug: Pepcid (famotidine) 20 mg Route: IVP; Site: right antecubital;                    vc1 

22:35 Follow up: Response: No adverse reaction                                                vc1 

20:39 Drug: Zofran (Ondansetron) 4 mg Route: IVP; Site: right antecubital;                    vc1 

21:15 Follow up: Response: No adverse reaction; Marked relief of symptoms; Nausea is decreasedvc1 

21:22 Drug: Potassium Chloride 40 mEq Route: PO;                                              vc1 

22:34 Follow up: Response: No adverse reaction                                                vc1 

21:22 Drug: Ketorolac 15 mg Route: IVP; Site: right antecubital;                              vc1 

22:34 Follow up: Response: No adverse reaction; Marked relief of symptoms; Pain is decreased  vc1 

22:16 Drug: NS 0.9% 1000 ml Route: IV; Rate: 1000 ml; Site: right antecubital;                vc1 

22:29 Drug: Phenergan (promethazine) 12.5 mg Route: IVP; Site: right antecubital;             vc1 

                                                                                                  

                                                                                                  

Medication:                                                                                       

23:58 VIS not applicable for this client.                                                     vc1 

                                                                                                  

Outcome:                                                                                          

23:44 Discharge ordered by MD.                                                                pm1 

23:58 Discharged to home ambulatory.                                                          vc1 

23:58 Condition: good                                                                             

23:58 Discharge instructions given to patient, Instructed on discharge instructions, follow       

      up and referral plans. medication usage, Demonstrated understanding of instructions,        

      follow-up care, medications, Prescriptions given X 3.                                       

23:58 Patient left the ED.                                                                    vc1 

                                                                                                  

Signatures:                                                                                       

Dispatcher [employer]                           EDMS                                                 

Irina Wayne RN                     RN   bb                                                   

Jane Decker RN                         RN   lp1                                                  

Marinas, Ilya, NP                    NP   pm1                                                  

Miranda Plaza RN                    RN   vc1                                                  

                                                                                                  

Corrections: (The following items were deleted from the chart)                                    

21:26 21:25 Notified Nurse Practitioner and/or Physician Assistant of a critical lab          bb  

      result(s), potassium of 2.9 Ilya Olmos NP notified bb                                  

                                                                                                  

**************************************************************************************************

## 2022-07-03 NOTE — RAD REPORT
EXAM DESCRIPTION:  RAD - Chest Single View - 7/3/2022 8:59 pm

 

CLINICAL HISTORY:  CHEST PAIN

 

COMPARISON:  None

 

TECHNIQUE:  AP portable chest image was obtained 7/3/2022 8:59 pm .

 

FINDINGS:  Lungs are clear. Heart and vasculature are normal. No measurable pleural effusion and no p
neumothorax. No acute bony abnormality seen. No acute aortic findings suspected.

 

IMPRESSION:  No acute cardiopulmonary process.

## 2022-07-03 NOTE — RAD REPORT
EXAM DESCRIPTION:  CT - Abdomen   Pelvis W Contrast - 7/3/2022 9:34 pm

 

CLINICAL HISTORY:  Epigastric pain

 

COMPARISON:  CT study April 2015

 

TECHNIQUE:  Biphasic, helical CT imaging of the abdomen and pelvis was performed following 100 ml non
-ionic IV contrast.

 

No oral contrast administered.

 

All CT scans are performed using dose optimization technique as appropriate and may include automated
 exposure control or mA/KV adjustment according to patient size.

 

FINDINGS:  No suspicious findings in the lung bases.

 

The liver, spleen, and pancreas show no suspicious findings. Gallbladder and biliary tree are also wi
thout suspicious finding.

 

Symmetric renal function is seen with no hydronephrosis or suspicious renal mass. No pyelonephritis o
r acute parenchymal process. No bladder abnormalities. No adrenal abnormalities. Uterus and ovaries s
how no suspicious findings.

 

No dilated bowel loops or bowel wall thickening. Appendectomy clips are in place. No free air, free f
luid or inflammatory stranding.  No hernia, mass or bulky lymphadenopathy.

 

No suspicious bony findings.

 

 

IMPRESSION:  Contrast enhanced CT abdomen and pelvis showing no acute or emergent finding.

## 2022-07-04 VITALS — OXYGEN SATURATION: 100 %

## 2022-07-04 VITALS — DIASTOLIC BLOOD PRESSURE: 59 MMHG | SYSTOLIC BLOOD PRESSURE: 98 MMHG

## 2022-07-04 VITALS — TEMPERATURE: 98.3 F
